# Patient Record
Sex: FEMALE | Race: WHITE | Employment: OTHER | URBAN - NONMETROPOLITAN AREA
[De-identification: names, ages, dates, MRNs, and addresses within clinical notes are randomized per-mention and may not be internally consistent; named-entity substitution may affect disease eponyms.]

---

## 2017-03-17 ENCOUNTER — HOSPITAL ENCOUNTER (OUTPATIENT)
Dept: GENERAL RADIOLOGY | Age: 82
Discharge: HOME | End: 2017-03-17
Payer: MEDICARE

## 2017-03-17 ENCOUNTER — HOSPITAL ENCOUNTER (OUTPATIENT)
Age: 82
Discharge: HOME | End: 2017-03-17
Payer: MEDICARE

## 2017-03-17 DIAGNOSIS — R10.31 RIGHT LOWER QUADRANT ABDOMINAL PAIN: ICD-10-CM

## 2017-03-17 DIAGNOSIS — R10.32 ABDOMINAL PAIN, LLQ: ICD-10-CM

## 2017-03-17 PROBLEM — M40.204 KYPHOSIS OF THORACIC REGION: Status: ACTIVE | Noted: 2017-03-17

## 2017-03-17 PROBLEM — K64.8 INTERNAL HEMORRHOID: Status: ACTIVE | Noted: 2017-03-17

## 2017-03-17 PROCEDURE — 74000 XR ABDOMEN LIMITED (KUB): CPT

## 2017-11-17 ENCOUNTER — OFFICE VISIT (OUTPATIENT)
Dept: FAMILY MEDICINE CLINIC | Age: 82
End: 2017-11-17
Payer: MEDICARE

## 2017-11-17 VITALS — DIASTOLIC BLOOD PRESSURE: 78 MMHG | BODY MASS INDEX: 23.69 KG/M2 | SYSTOLIC BLOOD PRESSURE: 122 MMHG | WEIGHT: 138 LBS

## 2017-11-17 DIAGNOSIS — Z00.00 ROUTINE GENERAL MEDICAL EXAMINATION AT A HEALTH CARE FACILITY: Primary | ICD-10-CM

## 2017-11-17 PROCEDURE — G8599 NO ASA/ANTIPLAT THER USE RNG: HCPCS | Performed by: FAMILY MEDICINE

## 2017-11-17 PROCEDURE — G0438 PPPS, INITIAL VISIT: HCPCS | Performed by: FAMILY MEDICINE

## 2017-11-17 NOTE — PROGRESS NOTES
[address] [address]  [place] [address]  Dept: [phone]      Beth Aguilar is a 80 y.o. female here for Medicare AWV      HPI:  Pt is concerned with her liver function , thyroid function and gallbladder function because of her \"inability to process proteins\". She states that when she began to increase her protein intake she started to feel allergic type symptoms which included intense itchy eyes, feels as her sinuses are inflammed and runny nose. She is also concerned that she is losing muscle mass and energy which is why she is concerned with her thyroid function. Prior to Admission medications    Medication Sig Start Date End Date Taking? Authorizing Provider   acetaminophen (TYLENOL) 500 MG tablet Take 500 mg by mouth every 6 hours as needed for Pain   Yes Historical Provider, MD   Loratadine (CLARITIN) 10 MG CAPS Take  by mouth daily. Yes Historical Provider, MD   ibuprofen (ADVIL;MOTRIN) 200 MG tablet Take 800 mg by mouth 2 times daily    Yes Historical Provider, MD   hydrocortisone (ANUSOL-HC) 25 MG suppository Place 1 suppository rectally every 12 hours 3/17/17   Marlon MD Dimitris       ROS:  General Constitutional: Denies chills. Denies fever. Denies headache. Denies lightheadedness. Ophthalmologic: Denies blurred vision. ENT: Admits nasal congestion. Denies sore throat. Denies ear pain and pressure. Respiratory: Denies cough. Denies shortness of breath. Denies wheezing. Cardiovascular: Denies chest pain at rest. Denies irregular heartbeat. Denies palpitations. Gastrointestinal: Denies abdominal pain. Denies blood in the stool. Denies constipation. Denies diarrhea. Denies nausea. Denies vomiting. Admits bloating after meals. Genitourinary: Denies blood in the urine. Denies difficulty urinating. Denies frequent urination. Denies painful urination. Denies urinary incontinence  Musculoskeletal: Denies muscle aches. Denies painful joints.

## 2017-11-17 NOTE — PROGRESS NOTES
[address]  [place] [phone]  Dept: [phone]      Mark Berman is a 80 y.o. female here for No chief complaint on file. HPI:  ***  Prior to Admission medications    Medication Sig Start Date End Date Taking? Authorizing Provider   hydrocortisone (ANUSOL-HC) 25 MG suppository Place 1 suppository rectally every 12 hours 3/17/17   Noelle Dawson MD   acetaminophen (TYLENOL) 500 MG tablet Take 500 mg by mouth every 6 hours as needed for Pain    Historical Provider, MD   Loratadine (CLARITIN) 10 MG CAPS Take  by mouth daily. Historical Provider, MD   ibuprofen (ADVIL;MOTRIN) 200 MG tablet Take 800 mg by mouth 2 times daily     Historical Provider, MD       ROS:  General Constitutional: Denies chills. Denies fever. Denies headache. Denies lightheadedness. Ophthalmologic: Denies blurred vision. ENT: Denies nasal congestion. Denies sore throat. Denies ear pain and pressure. Respiratory: Denies cough. Denies shortness of breath. Denies wheezing. Cardiovascular: Denies chest pain at rest. Denies irregular heartbeat. Denies palpitations. Gastrointestinal: Denies abdominal pain. Denies blood in the stool. Denies constipation. Denies diarrhea. Denies nausea. Denies vomiting. Genitourinary: Denies blood in the urine. Denies difficulty urinating. Denies frequent urination. Denies painful urination. Denies urinary incontinence  Musculoskeletal: Denies muscle aches. Denies painful joints. Denies swollen joints. Peripheral Vascular: Denies pain/cramping in legs after exertion. Skin: Denies dry skin. Denies itching. Denies rash. Neurologic: Denies falls. Denies dizziness. Denies fainting. Denies tingling/numbness. Psychiatric: Denies sleep disturbance. Denies anxiety. Denies depressed mood.     Past Surgical History:   Procedure Laterality Date    COLONOSCOPY  11/30/2016    -hemorrhoids    CYST REMOVAL      scalp, sebaceous, head/neck

## 2017-11-17 NOTE — PATIENT INSTRUCTIONS
specialist is recommended every 1-2 years to screen for glaucoma; cataracts, macular degeneration, and other eye disorders. · A preventive dental visit is recommended every 6 months. · Try to get at least 150 minutes of exercise per week or 10,000 steps per day on a pedometer . · Order or download the FREE \"Exercise & Physical Activity: Your Everyday Guide\" from [employer]. Call 1-186.594.6371 or search [employer] online. · You need 7684-3184 mg of calcium and 6789-2555 IU of vitamin D per day. It is possible to meet your calcium requirement with diet alone, but a vitamin D supplement is usually necessary to meet this goal.  · When exposed to the sun, use a sunscreen that protects against both UVA and UVB radiation with an SPF of 30 or greater. Reapply every 2 to 3 hours or after sweating, drying off with a towel, or swimming. · Always wear a seat belt when traveling in a car. Always wear a helmet when riding a bicycle or motorcycle.

## 2017-11-20 ASSESSMENT — PATIENT HEALTH QUESTIONNAIRE - PHQ9
2. FEELING DOWN, DEPRESSED OR HOPELESS: 0
SUM OF ALL RESPONSES TO PHQ QUESTIONS 1-9: 0
SUM OF ALL RESPONSES TO PHQ9 QUESTIONS 1 & 2: 0
1. LITTLE INTEREST OR PLEASURE IN DOING THINGS: 0

## 2018-01-25 ENCOUNTER — OFFICE VISIT (OUTPATIENT)
Dept: SURGERY | Age: 83
End: 2018-01-25
Payer: MEDICARE

## 2018-01-25 VITALS
DIASTOLIC BLOOD PRESSURE: 93 MMHG | BODY MASS INDEX: 23.7 KG/M2 | TEMPERATURE: 97.9 F | HEIGHT: 64 IN | HEART RATE: 63 BPM | WEIGHT: 138.8 LBS | SYSTOLIC BLOOD PRESSURE: 159 MMHG

## 2018-01-25 DIAGNOSIS — K90.0: Primary | ICD-10-CM

## 2018-01-25 DIAGNOSIS — K59.04 CHRONIC IDIOPATHIC CONSTIPATION: ICD-10-CM

## 2018-01-25 DIAGNOSIS — K64.8 INTERNAL HEMORRHOIDS: ICD-10-CM

## 2018-01-25 DIAGNOSIS — R14.0 POSTPRANDIAL ABDOMINAL BLOATING: ICD-10-CM

## 2018-01-25 PROCEDURE — 1036F TOBACCO NON-USER: CPT | Performed by: SURGERY

## 2018-01-25 PROCEDURE — G8484 FLU IMMUNIZE NO ADMIN: HCPCS | Performed by: SURGERY

## 2018-01-25 PROCEDURE — G8427 DOCREV CUR MEDS BY ELIG CLIN: HCPCS | Performed by: SURGERY

## 2018-01-25 PROCEDURE — G8400 PT W/DXA NO RESULTS DOC: HCPCS | Performed by: SURGERY

## 2018-01-25 PROCEDURE — 1123F ACP DISCUSS/DSCN MKR DOCD: CPT | Performed by: SURGERY

## 2018-01-25 PROCEDURE — G8420 CALC BMI NORM PARAMETERS: HCPCS | Performed by: SURGERY

## 2018-01-25 PROCEDURE — 99214 OFFICE O/P EST MOD 30 MIN: CPT | Performed by: SURGERY

## 2018-01-25 PROCEDURE — 1090F PRES/ABSN URINE INCON ASSESS: CPT | Performed by: SURGERY

## 2018-01-25 PROCEDURE — 4040F PNEUMOC VAC/ADMIN/RCVD: CPT | Performed by: SURGERY

## 2018-02-07 ENCOUNTER — TELEPHONE (OUTPATIENT)
Dept: FAMILY MEDICINE CLINIC | Age: 83
End: 2018-02-07

## 2018-02-07 DIAGNOSIS — R10.9 ABDOMINAL DISCOMFORT: ICD-10-CM

## 2018-02-07 DIAGNOSIS — R10.31 RIGHT LOWER QUADRANT ABDOMINAL PAIN: Primary | ICD-10-CM

## 2018-02-07 DIAGNOSIS — R14.0 ABDOMINAL BLOATING: ICD-10-CM

## 2018-02-07 DIAGNOSIS — K64.8 INTERNAL HEMORRHOID: ICD-10-CM

## 2018-04-12 PROBLEM — Z00.00 ROUTINE GENERAL MEDICAL EXAMINATION AT A HEALTH CARE FACILITY: Status: RESOLVED | Noted: 2017-11-17 | Resolved: 2018-04-12

## 2018-05-12 ENCOUNTER — APPOINTMENT (OUTPATIENT)
Dept: GENERAL RADIOLOGY | Age: 83
DRG: 481 | End: 2018-05-12
Payer: MEDICARE

## 2018-05-12 ENCOUNTER — HOSPITAL ENCOUNTER (INPATIENT)
Age: 83
LOS: 4 days | Discharge: NURSING, REHAB OR LONG-TERM CARE | DRG: 481 | End: 2018-05-16
Attending: FAMILY MEDICINE | Admitting: INTERNAL MEDICINE
Payer: MEDICARE

## 2018-05-12 DIAGNOSIS — D62 ACUTE BLOOD LOSS AS CAUSE OF POSTOPERATIVE ANEMIA: ICD-10-CM

## 2018-05-12 DIAGNOSIS — S72.002A CLOSED FRACTURE OF LEFT HIP, INITIAL ENCOUNTER (HCC): Primary | ICD-10-CM

## 2018-05-12 DIAGNOSIS — T14.8XXA FRACTURE: ICD-10-CM

## 2018-05-12 DIAGNOSIS — D72.829 LEUKOCYTOSIS, UNSPECIFIED TYPE: ICD-10-CM

## 2018-05-12 DIAGNOSIS — S70.211A ABRASION OF RIGHT HIP, INITIAL ENCOUNTER: ICD-10-CM

## 2018-05-12 DIAGNOSIS — S72.142A CLOSED DISPLACED INTERTROCHANTERIC FRACTURE OF LEFT FEMUR, INITIAL ENCOUNTER (HCC): ICD-10-CM

## 2018-05-12 DIAGNOSIS — T79.6XXA TRAUMATIC RHABDOMYOLYSIS, INITIAL ENCOUNTER (HCC): ICD-10-CM

## 2018-05-12 PROBLEM — R10.32 ABDOMINAL PAIN, LLQ: Status: RESOLVED | Noted: 2017-03-17 | Resolved: 2018-05-12

## 2018-05-12 PROBLEM — M40.204 KYPHOSIS OF THORACIC REGION: Chronic | Status: ACTIVE | Noted: 2017-03-17

## 2018-05-12 PROBLEM — K64.8 INTERNAL HEMORRHOID: Chronic | Status: ACTIVE | Noted: 2017-03-17

## 2018-05-12 LAB
-: ABNORMAL
ABSOLUTE EOS #: 0 K/UL (ref 0–0.44)
ABSOLUTE IMMATURE GRANULOCYTE: 0 K/UL (ref 0–0.3)
ABSOLUTE LYMPH #: 2.06 K/UL (ref 1.1–3.7)
ABSOLUTE MONO #: 0.94 K/UL (ref 0.1–1.2)
ALBUMIN SERPL-MCNC: 4.1 G/DL (ref 3.5–5.2)
ALBUMIN/GLOBULIN RATIO: 1.2 (ref 1–2.5)
ALP BLD-CCNC: 93 U/L (ref 35–104)
ALT SERPL-CCNC: 29 U/L (ref 5–33)
AMORPHOUS: ABNORMAL
ANION GAP SERPL CALCULATED.3IONS-SCNC: 15 MMOL/L (ref 9–17)
AST SERPL-CCNC: 55 U/L
BACTERIA: ABNORMAL
BASOPHILS # BLD: 0 % (ref 0–2)
BASOPHILS ABSOLUTE: 0 K/UL (ref 0–0.2)
BILIRUB SERPL-MCNC: 0.82 MG/DL (ref 0.3–1.2)
BILIRUBIN URINE: NEGATIVE
BUN BLDV-MCNC: 21 MG/DL (ref 8–23)
BUN/CREAT BLD: 37 (ref 9–20)
CALCIUM SERPL-MCNC: 9.6 MG/DL (ref 8.6–10.4)
CASTS UA: ABNORMAL /LPF
CHLORIDE BLD-SCNC: 99 MMOL/L (ref 98–107)
CO2: 24 MMOL/L (ref 20–31)
COLOR: YELLOW
COMMENT UA: ABNORMAL
CREAT SERPL-MCNC: 0.57 MG/DL (ref 0.5–0.9)
CRYSTALS, UA: ABNORMAL /HPF
DIFFERENTIAL TYPE: ABNORMAL
EOSINOPHILS RELATIVE PERCENT: 0 % (ref 1–4)
EPITHELIAL CELLS UA: ABNORMAL /HPF (ref 0–25)
GFR AFRICAN AMERICAN: >60 ML/MIN
GFR NON-AFRICAN AMERICAN: >60 ML/MIN
GFR SERPL CREATININE-BSD FRML MDRD: ABNORMAL ML/MIN/{1.73_M2}
GFR SERPL CREATININE-BSD FRML MDRD: ABNORMAL ML/MIN/{1.73_M2}
GLUCOSE BLD-MCNC: 147 MG/DL (ref 70–99)
GLUCOSE URINE: NEGATIVE
HCT VFR BLD CALC: 37 % (ref 36.3–47.1)
HEMOGLOBIN: 12.4 G/DL (ref 11.9–15.1)
IMMATURE GRANULOCYTES: 0 %
INR BLD: 1 (ref 0.9–1.2)
KETONES, URINE: ABNORMAL
LEUKOCYTE ESTERASE, URINE: ABNORMAL
LYMPHOCYTES # BLD: 11 % (ref 24–43)
MCH RBC QN AUTO: 29.8 PG (ref 25.2–33.5)
MCHC RBC AUTO-ENTMCNC: 33.5 G/DL (ref 28.4–34.8)
MCV RBC AUTO: 88.9 FL (ref 82.6–102.9)
MONOCYTES # BLD: 5 % (ref 3–12)
MORPHOLOGY: NORMAL
MUCUS: ABNORMAL
NITRITE, URINE: NEGATIVE
NRBC AUTOMATED: 0 PER 100 WBC
OTHER OBSERVATIONS UA: ABNORMAL
PARTIAL THROMBOPLASTIN TIME: 20.3 SEC (ref 23.2–34.4)
PDW BLD-RTO: 12.9 % (ref 11.8–14.4)
PH UA: 6 (ref 5–9)
PLATELET # BLD: 215 K/UL (ref 138–453)
PLATELET ESTIMATE: ABNORMAL
PMV BLD AUTO: 10.1 FL (ref 8.1–13.5)
POTASSIUM SERPL-SCNC: 4.3 MMOL/L (ref 3.7–5.3)
PROTEIN UA: ABNORMAL
PROTHROMBIN TIME: 10.2 SEC (ref 9.7–12.2)
RBC # BLD: 4.16 M/UL (ref 3.95–5.11)
RBC # BLD: ABNORMAL 10*6/UL
RBC UA: ABNORMAL /HPF (ref 0–2)
RENAL EPITHELIAL, UA: ABNORMAL /HPF
SEG NEUTROPHILS: 84 % (ref 36–65)
SEGMENTED NEUTROPHILS ABSOLUTE COUNT: 15.7 K/UL (ref 1.5–8.1)
SODIUM BLD-SCNC: 138 MMOL/L (ref 135–144)
SPECIFIC GRAVITY UA: 1.02 (ref 1.01–1.02)
TOTAL CK: 825 U/L (ref 26–192)
TOTAL PROTEIN: 7.4 G/DL (ref 6.4–8.3)
TRICHOMONAS: ABNORMAL
TURBIDITY: CLEAR
URINE HGB: NEGATIVE
UROBILINOGEN, URINE: NORMAL
WBC # BLD: 18.7 K/UL (ref 3.5–11.3)
WBC # BLD: ABNORMAL 10*3/UL
WBC UA: ABNORMAL /HPF (ref 0–5)
YEAST: ABNORMAL

## 2018-05-12 PROCEDURE — 85610 PROTHROMBIN TIME: CPT

## 2018-05-12 PROCEDURE — 81001 URINALYSIS AUTO W/SCOPE: CPT

## 2018-05-12 PROCEDURE — 71045 X-RAY EXAM CHEST 1 VIEW: CPT

## 2018-05-12 PROCEDURE — 86901 BLOOD TYPING SEROLOGIC RH(D): CPT

## 2018-05-12 PROCEDURE — 85025 COMPLETE CBC W/AUTO DIFF WBC: CPT

## 2018-05-12 PROCEDURE — 6360000002 HC RX W HCPCS: Performed by: PHYSICIAN ASSISTANT

## 2018-05-12 PROCEDURE — 96374 THER/PROPH/DIAG INJ IV PUSH: CPT

## 2018-05-12 PROCEDURE — 86850 RBC ANTIBODY SCREEN: CPT

## 2018-05-12 PROCEDURE — 6370000000 HC RX 637 (ALT 250 FOR IP): Performed by: INTERNAL MEDICINE

## 2018-05-12 PROCEDURE — 96375 TX/PRO/DX INJ NEW DRUG ADDON: CPT

## 2018-05-12 PROCEDURE — 94760 N-INVAS EAR/PLS OXIMETRY 1: CPT

## 2018-05-12 PROCEDURE — 73502 X-RAY EXAM HIP UNI 2-3 VIEWS: CPT

## 2018-05-12 PROCEDURE — 51702 INSERT TEMP BLADDER CATH: CPT

## 2018-05-12 PROCEDURE — 99285 EMERGENCY DEPT VISIT HI MDM: CPT

## 2018-05-12 PROCEDURE — 82550 ASSAY OF CK (CPK): CPT

## 2018-05-12 PROCEDURE — 2580000003 HC RX 258: Performed by: PHYSICIAN ASSISTANT

## 2018-05-12 PROCEDURE — 73552 X-RAY EXAM OF FEMUR 2/>: CPT

## 2018-05-12 PROCEDURE — 85730 THROMBOPLASTIN TIME PARTIAL: CPT

## 2018-05-12 PROCEDURE — 36415 COLL VENOUS BLD VENIPUNCTURE: CPT

## 2018-05-12 PROCEDURE — 6360000002 HC RX W HCPCS: Performed by: INTERNAL MEDICINE

## 2018-05-12 PROCEDURE — 86900 BLOOD TYPING SEROLOGIC ABO: CPT

## 2018-05-12 PROCEDURE — 2580000003 HC RX 258: Performed by: INTERNAL MEDICINE

## 2018-05-12 PROCEDURE — 1200000000 HC SEMI PRIVATE

## 2018-05-12 PROCEDURE — 80053 COMPREHEN METABOLIC PANEL: CPT

## 2018-05-12 RX ORDER — MORPHINE SULFATE 4 MG/ML
4 INJECTION, SOLUTION INTRAMUSCULAR; INTRAVENOUS ONCE
Status: COMPLETED | OUTPATIENT
Start: 2018-05-12 | End: 2018-05-12

## 2018-05-12 RX ORDER — ACETAMINOPHEN 500 MG
500 TABLET ORAL EVERY 6 HOURS PRN
Status: DISCONTINUED | OUTPATIENT
Start: 2018-05-12 | End: 2018-05-16 | Stop reason: HOSPADM

## 2018-05-12 RX ORDER — SODIUM CHLORIDE 9 MG/ML
INJECTION, SOLUTION INTRAVENOUS CONTINUOUS
Status: DISCONTINUED | OUTPATIENT
Start: 2018-05-12 | End: 2018-05-15

## 2018-05-12 RX ORDER — ONDANSETRON 2 MG/ML
4 INJECTION INTRAMUSCULAR; INTRAVENOUS EVERY 6 HOURS PRN
Status: DISCONTINUED | OUTPATIENT
Start: 2018-05-12 | End: 2018-05-16 | Stop reason: HOSPADM

## 2018-05-12 RX ORDER — SODIUM CHLORIDE 0.9 % (FLUSH) 0.9 %
10 SYRINGE (ML) INJECTION PRN
Status: DISCONTINUED | OUTPATIENT
Start: 2018-05-12 | End: 2018-05-16 | Stop reason: HOSPADM

## 2018-05-12 RX ORDER — ONDANSETRON 2 MG/ML
4 INJECTION INTRAMUSCULAR; INTRAVENOUS ONCE
Status: COMPLETED | OUTPATIENT
Start: 2018-05-12 | End: 2018-05-12

## 2018-05-12 RX ORDER — SODIUM CHLORIDE 9 MG/ML
INJECTION, SOLUTION INTRAVENOUS CONTINUOUS
Status: DISCONTINUED | OUTPATIENT
Start: 2018-05-12 | End: 2018-05-13

## 2018-05-12 RX ORDER — MORPHINE SULFATE 10 MG/ML
4 INJECTION, SOLUTION INTRAMUSCULAR; INTRAVENOUS
Status: DISCONTINUED | OUTPATIENT
Start: 2018-05-12 | End: 2018-05-16 | Stop reason: HOSPADM

## 2018-05-12 RX ORDER — MORPHINE SULFATE 10 MG/ML
2 INJECTION, SOLUTION INTRAMUSCULAR; INTRAVENOUS
Status: DISCONTINUED | OUTPATIENT
Start: 2018-05-12 | End: 2018-05-16 | Stop reason: HOSPADM

## 2018-05-12 RX ORDER — SODIUM CHLORIDE 0.9 % (FLUSH) 0.9 %
10 SYRINGE (ML) INJECTION EVERY 12 HOURS SCHEDULED
Status: DISCONTINUED | OUTPATIENT
Start: 2018-05-12 | End: 2018-05-16 | Stop reason: HOSPADM

## 2018-05-12 RX ADMIN — SODIUM CHLORIDE: 9 INJECTION, SOLUTION INTRAVENOUS at 14:34

## 2018-05-12 RX ADMIN — ACETAMINOPHEN 500 MG: 500 TABLET ORAL at 22:33

## 2018-05-12 RX ADMIN — ENOXAPARIN SODIUM 40 MG: 40 INJECTION SUBCUTANEOUS at 19:39

## 2018-05-12 RX ADMIN — MORPHINE SULFATE 4 MG: 4 INJECTION INTRAVENOUS at 13:55

## 2018-05-12 RX ADMIN — MORPHINE SULFATE 4 MG: 10 INJECTION, SOLUTION INTRAMUSCULAR; INTRAVENOUS at 23:37

## 2018-05-12 RX ADMIN — SODIUM CHLORIDE: 9 INJECTION, SOLUTION INTRAVENOUS at 19:39

## 2018-05-12 RX ADMIN — ONDANSETRON 4 MG: 2 INJECTION INTRAMUSCULAR; INTRAVENOUS at 13:55

## 2018-05-12 ASSESSMENT — PAIN DESCRIPTION - ORIENTATION
ORIENTATION: LEFT
ORIENTATION: RIGHT
ORIENTATION: RIGHT
ORIENTATION: LEFT

## 2018-05-12 ASSESSMENT — PAIN SCALES - GENERAL
PAINLEVEL_OUTOF10: 7
PAINLEVEL_OUTOF10: 4
PAINLEVEL_OUTOF10: 3
PAINLEVEL_OUTOF10: 4
PAINLEVEL_OUTOF10: 6
PAINLEVEL_OUTOF10: 4
PAINLEVEL_OUTOF10: 2

## 2018-05-12 ASSESSMENT — ENCOUNTER SYMPTOMS
ABDOMINAL PAIN: 0
SHORTNESS OF BREATH: 0
NAUSEA: 0
COUGH: 0
VOMITING: 0

## 2018-05-12 ASSESSMENT — PAIN DESCRIPTION - FREQUENCY
FREQUENCY: CONTINUOUS
FREQUENCY: CONTINUOUS

## 2018-05-12 ASSESSMENT — PAIN DESCRIPTION - LOCATION
LOCATION: HIP

## 2018-05-12 ASSESSMENT — PAIN DESCRIPTION - DESCRIPTORS
DESCRIPTORS: ACHING
DESCRIPTORS: ACHING;SHARP

## 2018-05-12 ASSESSMENT — PAIN DESCRIPTION - PAIN TYPE
TYPE: ACUTE PAIN
TYPE: ACUTE PAIN

## 2018-05-13 ENCOUNTER — ANESTHESIA EVENT (OUTPATIENT)
Dept: OPERATING ROOM | Age: 83
DRG: 481 | End: 2018-05-13
Payer: MEDICARE

## 2018-05-13 ENCOUNTER — APPOINTMENT (OUTPATIENT)
Dept: GENERAL RADIOLOGY | Age: 83
DRG: 481 | End: 2018-05-13
Payer: MEDICARE

## 2018-05-13 ENCOUNTER — ANESTHESIA (OUTPATIENT)
Dept: OPERATING ROOM | Age: 83
DRG: 481 | End: 2018-05-13
Payer: MEDICARE

## 2018-05-13 VITALS — TEMPERATURE: 96.7 F | SYSTOLIC BLOOD PRESSURE: 74 MMHG | DIASTOLIC BLOOD PRESSURE: 48 MMHG | OXYGEN SATURATION: 90 %

## 2018-05-13 PROBLEM — S72.142A CLOSED DISPLACED INTERTROCHANTERIC FRACTURE OF LEFT FEMUR (HCC): Status: ACTIVE | Noted: 2018-05-12

## 2018-05-13 LAB
ABSOLUTE EOS #: 0.03 K/UL (ref 0–0.44)
ABSOLUTE IMMATURE GRANULOCYTE: 0.08 K/UL (ref 0–0.3)
ABSOLUTE LYMPH #: 1.9 K/UL (ref 1.1–3.7)
ABSOLUTE MONO #: 1.24 K/UL (ref 0.1–1.2)
ANION GAP SERPL CALCULATED.3IONS-SCNC: 8 MMOL/L (ref 9–17)
BASOPHILS # BLD: 0 % (ref 0–2)
BASOPHILS ABSOLUTE: 0.05 K/UL (ref 0–0.2)
BUN BLDV-MCNC: 27 MG/DL (ref 8–23)
BUN/CREAT BLD: 39 (ref 9–20)
CALCIUM SERPL-MCNC: 8.7 MG/DL (ref 8.6–10.4)
CHLORIDE BLD-SCNC: 104 MMOL/L (ref 98–107)
CO2: 26 MMOL/L (ref 20–31)
CREAT SERPL-MCNC: 0.7 MG/DL (ref 0.5–0.9)
DIFFERENTIAL TYPE: ABNORMAL
EOSINOPHILS RELATIVE PERCENT: 0 % (ref 1–4)
GFR AFRICAN AMERICAN: >60 ML/MIN
GFR NON-AFRICAN AMERICAN: >60 ML/MIN
GFR SERPL CREATININE-BSD FRML MDRD: ABNORMAL ML/MIN/{1.73_M2}
GFR SERPL CREATININE-BSD FRML MDRD: ABNORMAL ML/MIN/{1.73_M2}
GLUCOSE BLD-MCNC: 99 MG/DL (ref 70–99)
HCT VFR BLD CALC: 29.5 % (ref 36.3–47.1)
HEMOGLOBIN: 9.8 G/DL (ref 11.9–15.1)
IMMATURE GRANULOCYTES: 1 %
LYMPHOCYTES # BLD: 11 % (ref 24–43)
MCH RBC QN AUTO: 30.4 PG (ref 25.2–33.5)
MCHC RBC AUTO-ENTMCNC: 33.2 G/DL (ref 28.4–34.8)
MCV RBC AUTO: 91.6 FL (ref 82.6–102.9)
MONOCYTES # BLD: 7 % (ref 3–12)
NRBC AUTOMATED: 0 PER 100 WBC
PDW BLD-RTO: 13.2 % (ref 11.8–14.4)
PLATELET # BLD: 144 K/UL (ref 138–453)
PLATELET ESTIMATE: ABNORMAL
PMV BLD AUTO: 9.5 FL (ref 8.1–13.5)
POTASSIUM SERPL-SCNC: 4 MMOL/L (ref 3.7–5.3)
RBC # BLD: 3.22 M/UL (ref 3.95–5.11)
RBC # BLD: ABNORMAL 10*6/UL
SEG NEUTROPHILS: 81 % (ref 36–65)
SEGMENTED NEUTROPHILS ABSOLUTE COUNT: 14.05 K/UL (ref 1.5–8.1)
SODIUM BLD-SCNC: 138 MMOL/L (ref 135–144)
TOTAL CK: 467 U/L (ref 26–192)
WBC # BLD: 17.4 K/UL (ref 3.5–11.3)
WBC # BLD: ABNORMAL 10*3/UL

## 2018-05-13 PROCEDURE — 36415 COLL VENOUS BLD VENIPUNCTURE: CPT

## 2018-05-13 PROCEDURE — 85025 COMPLETE CBC W/AUTO DIFF WBC: CPT

## 2018-05-13 PROCEDURE — 1200000000 HC SEMI PRIVATE

## 2018-05-13 PROCEDURE — 6360000002 HC RX W HCPCS: Performed by: ORTHOPAEDIC SURGERY

## 2018-05-13 PROCEDURE — C1769 GUIDE WIRE: HCPCS | Performed by: ORTHOPAEDIC SURGERY

## 2018-05-13 PROCEDURE — 3600000014 HC SURGERY LEVEL 4 ADDTL 15MIN: Performed by: ORTHOPAEDIC SURGERY

## 2018-05-13 PROCEDURE — 0QS706Z REPOSITION LEFT UPPER FEMUR WITH INTRAMEDULLARY INTERNAL FIXATION DEVICE, OPEN APPROACH: ICD-10-PCS | Performed by: ORTHOPAEDIC SURGERY

## 2018-05-13 PROCEDURE — 3600000004 HC SURGERY LEVEL 4 BASE: Performed by: ORTHOPAEDIC SURGERY

## 2018-05-13 PROCEDURE — 2720000010 HC SURG SUPPLY STERILE: Performed by: ORTHOPAEDIC SURGERY

## 2018-05-13 PROCEDURE — 6360000002 HC RX W HCPCS: Performed by: INTERNAL MEDICINE

## 2018-05-13 PROCEDURE — 82550 ASSAY OF CK (CPK): CPT

## 2018-05-13 PROCEDURE — 2580000003 HC RX 258: Performed by: NURSE ANESTHETIST, CERTIFIED REGISTERED

## 2018-05-13 PROCEDURE — 3700000001 HC ADD 15 MINUTES (ANESTHESIA): Performed by: ORTHOPAEDIC SURGERY

## 2018-05-13 PROCEDURE — 80048 BASIC METABOLIC PNL TOTAL CA: CPT

## 2018-05-13 PROCEDURE — 73502 X-RAY EXAM HIP UNI 2-3 VIEWS: CPT

## 2018-05-13 PROCEDURE — 7100000000 HC PACU RECOVERY - FIRST 15 MIN: Performed by: ORTHOPAEDIC SURGERY

## 2018-05-13 PROCEDURE — 76942 ECHO GUIDE FOR BIOPSY: CPT | Performed by: NURSE ANESTHETIST, CERTIFIED REGISTERED

## 2018-05-13 PROCEDURE — 2780000010 HC IMPLANT OTHER: Performed by: ORTHOPAEDIC SURGERY

## 2018-05-13 PROCEDURE — 6370000000 HC RX 637 (ALT 250 FOR IP): Performed by: INTERNAL MEDICINE

## 2018-05-13 PROCEDURE — 2580000003 HC RX 258: Performed by: INTERNAL MEDICINE

## 2018-05-13 PROCEDURE — C1713 ANCHOR/SCREW BN/BN,TIS/BN: HCPCS | Performed by: ORTHOPAEDIC SURGERY

## 2018-05-13 PROCEDURE — 6360000002 HC RX W HCPCS: Performed by: NURSE ANESTHETIST, CERTIFIED REGISTERED

## 2018-05-13 PROCEDURE — 2500000003 HC RX 250 WO HCPCS: Performed by: NURSE ANESTHETIST, CERTIFIED REGISTERED

## 2018-05-13 PROCEDURE — 7100000001 HC PACU RECOVERY - ADDTL 15 MIN: Performed by: ORTHOPAEDIC SURGERY

## 2018-05-13 PROCEDURE — 3700000000 HC ANESTHESIA ATTENDED CARE: Performed by: ORTHOPAEDIC SURGERY

## 2018-05-13 DEVICE — NAIL IM L400MM DIA12MM 130DEG LNG L PROX FEM GRN TI CANN: Type: IMPLANTABLE DEVICE | Site: HIP | Status: FUNCTIONAL

## 2018-05-13 DEVICE — SCREW BNE L44MM DIA5MM COR DIA4.3MM TIB LT GRN TI ALLY ST: Type: IMPLANTABLE DEVICE | Site: HIP | Status: FUNCTIONAL

## 2018-05-13 DEVICE — SCREW BNE L48MM DIA5MM LAT FEM LT GRN TI ST LOK FULL THRD: Type: IMPLANTABLE DEVICE | Site: HIP | Status: FUNCTIONAL

## 2018-05-13 DEVICE — BLADE IM L100MM DIA1035MM PROX FEM G TI CANN HELI TFN ADV: Type: IMPLANTABLE DEVICE | Site: HIP | Status: FUNCTIONAL

## 2018-05-13 RX ORDER — FENTANYL CITRATE 50 UG/ML
INJECTION, SOLUTION INTRAMUSCULAR; INTRAVENOUS PRN
Status: DISCONTINUED | OUTPATIENT
Start: 2018-05-13 | End: 2018-05-13 | Stop reason: SDUPTHER

## 2018-05-13 RX ORDER — FENTANYL CITRATE 50 UG/ML
50 INJECTION, SOLUTION INTRAMUSCULAR; INTRAVENOUS EVERY 5 MIN PRN
Status: DISCONTINUED | OUTPATIENT
Start: 2018-05-13 | End: 2018-05-13 | Stop reason: HOSPADM

## 2018-05-13 RX ORDER — SODIUM CHLORIDE 0.9 % (FLUSH) 0.9 %
10 SYRINGE (ML) INJECTION PRN
Status: DISCONTINUED | OUTPATIENT
Start: 2018-05-13 | End: 2018-05-16 | Stop reason: HOSPADM

## 2018-05-13 RX ORDER — ROPIVACAINE HYDROCHLORIDE 5 MG/ML
INJECTION, SOLUTION EPIDURAL; INFILTRATION; PERINEURAL PRN
Status: DISCONTINUED | OUTPATIENT
Start: 2018-05-13 | End: 2018-05-13 | Stop reason: SDUPTHER

## 2018-05-13 RX ORDER — LIDOCAINE HYDROCHLORIDE 20 MG/ML
INJECTION, SOLUTION INFILTRATION; PERINEURAL PRN
Status: DISCONTINUED | OUTPATIENT
Start: 2018-05-13 | End: 2018-05-13 | Stop reason: SDUPTHER

## 2018-05-13 RX ORDER — FENTANYL CITRATE 50 UG/ML
25 INJECTION, SOLUTION INTRAMUSCULAR; INTRAVENOUS EVERY 5 MIN PRN
Status: DISCONTINUED | OUTPATIENT
Start: 2018-05-13 | End: 2018-05-13 | Stop reason: HOSPADM

## 2018-05-13 RX ORDER — SODIUM CHLORIDE, SODIUM LACTATE, POTASSIUM CHLORIDE, CALCIUM CHLORIDE 600; 310; 30; 20 MG/100ML; MG/100ML; MG/100ML; MG/100ML
INJECTION, SOLUTION INTRAVENOUS CONTINUOUS PRN
Status: DISCONTINUED | OUTPATIENT
Start: 2018-05-13 | End: 2018-05-13 | Stop reason: SDUPTHER

## 2018-05-13 RX ORDER — HYDROCODONE BITARTRATE AND ACETAMINOPHEN 5; 325 MG/1; MG/1
1 TABLET ORAL EVERY 4 HOURS PRN
Qty: 40 TABLET | Refills: 0 | Status: SHIPPED | OUTPATIENT
Start: 2018-05-13 | End: 2018-05-20

## 2018-05-13 RX ORDER — DOCUSATE SODIUM 100 MG/1
100 CAPSULE, LIQUID FILLED ORAL 2 TIMES DAILY
Status: DISCONTINUED | OUTPATIENT
Start: 2018-05-14 | End: 2018-05-16 | Stop reason: HOSPADM

## 2018-05-13 RX ORDER — SODIUM CHLORIDE 0.9 % (FLUSH) 0.9 %
10 SYRINGE (ML) INJECTION EVERY 12 HOURS SCHEDULED
Status: DISCONTINUED | OUTPATIENT
Start: 2018-05-13 | End: 2018-05-16 | Stop reason: HOSPADM

## 2018-05-13 RX ORDER — HYDROCODONE BITARTRATE AND ACETAMINOPHEN 5; 325 MG/1; MG/1
2 TABLET ORAL EVERY 4 HOURS PRN
Status: DISCONTINUED | OUTPATIENT
Start: 2018-05-13 | End: 2018-05-16 | Stop reason: HOSPADM

## 2018-05-13 RX ORDER — EPHEDRINE SULFATE 50 MG/ML
INJECTION, SOLUTION INTRAVENOUS PRN
Status: DISCONTINUED | OUTPATIENT
Start: 2018-05-13 | End: 2018-05-13 | Stop reason: SDUPTHER

## 2018-05-13 RX ORDER — DEXAMETHASONE SODIUM PHOSPHATE 10 MG/ML
INJECTION INTRAMUSCULAR; INTRAVENOUS PRN
Status: DISCONTINUED | OUTPATIENT
Start: 2018-05-13 | End: 2018-05-13 | Stop reason: SDUPTHER

## 2018-05-13 RX ORDER — POLYETHYLENE GLYCOL 3350 17 G/17G
17 POWDER, FOR SOLUTION ORAL DAILY PRN
Status: DISCONTINUED | OUTPATIENT
Start: 2018-05-13 | End: 2018-05-16 | Stop reason: HOSPADM

## 2018-05-13 RX ORDER — PROPOFOL 10 MG/ML
INJECTION, EMULSION INTRAVENOUS CONTINUOUS PRN
Status: DISCONTINUED | OUTPATIENT
Start: 2018-05-13 | End: 2018-05-13 | Stop reason: SDUPTHER

## 2018-05-13 RX ORDER — ONDANSETRON 2 MG/ML
4 INJECTION INTRAMUSCULAR; INTRAVENOUS
Status: DISCONTINUED | OUTPATIENT
Start: 2018-05-13 | End: 2018-05-13 | Stop reason: HOSPADM

## 2018-05-13 RX ORDER — OXYCODONE HYDROCHLORIDE 5 MG/1
5 TABLET ORAL
Status: DISCONTINUED | OUTPATIENT
Start: 2018-05-13 | End: 2018-05-13 | Stop reason: HOSPADM

## 2018-05-13 RX ORDER — HYDROCODONE BITARTRATE AND ACETAMINOPHEN 5; 325 MG/1; MG/1
1 TABLET ORAL EVERY 4 HOURS PRN
Status: DISCONTINUED | OUTPATIENT
Start: 2018-05-13 | End: 2018-05-16 | Stop reason: HOSPADM

## 2018-05-13 RX ADMIN — DEXAMETHASONE SODIUM PHOSPHATE 10 MG: 10 INJECTION INTRAMUSCULAR; INTRAVENOUS at 11:37

## 2018-05-13 RX ADMIN — PHENYLEPHRINE HYDROCHLORIDE 200 MCG: 10 INJECTION INTRAVENOUS at 12:39

## 2018-05-13 RX ADMIN — MORPHINE SULFATE 4 MG: 10 INJECTION, SOLUTION INTRAMUSCULAR; INTRAVENOUS at 05:09

## 2018-05-13 RX ADMIN — FENTANYL CITRATE 25 MCG: 50 INJECTION INTRAMUSCULAR; INTRAVENOUS at 12:15

## 2018-05-13 RX ADMIN — SODIUM CHLORIDE, POTASSIUM CHLORIDE, SODIUM LACTATE AND CALCIUM CHLORIDE: 600; 310; 30; 20 INJECTION, SOLUTION INTRAVENOUS at 12:55

## 2018-05-13 RX ADMIN — Medication 10 ML: at 09:31

## 2018-05-13 RX ADMIN — CEFAZOLIN SODIUM 2 G: 2 SOLUTION INTRAVENOUS at 17:26

## 2018-05-13 RX ADMIN — EPHEDRINE SULFATE 10 MG: 50 INJECTION INTRAMUSCULAR; INTRAVENOUS; SUBCUTANEOUS at 13:00

## 2018-05-13 RX ADMIN — FENTANYL CITRATE 50 MCG: 50 INJECTION INTRAMUSCULAR; INTRAVENOUS at 11:24

## 2018-05-13 RX ADMIN — PHENYLEPHRINE HYDROCHLORIDE 100 MCG: 10 INJECTION INTRAVENOUS at 12:57

## 2018-05-13 RX ADMIN — EPHEDRINE SULFATE 5 MG: 50 INJECTION INTRAMUSCULAR; INTRAVENOUS; SUBCUTANEOUS at 13:42

## 2018-05-13 RX ADMIN — MORPHINE SULFATE 4 MG: 10 INJECTION, SOLUTION INTRAMUSCULAR; INTRAVENOUS at 09:26

## 2018-05-13 RX ADMIN — PHENYLEPHRINE HYDROCHLORIDE 200 MCG: 10 INJECTION INTRAVENOUS at 12:47

## 2018-05-13 RX ADMIN — PROPOFOL 50 MCG/KG/MIN: 10 INJECTION, EMULSION INTRAVENOUS at 12:30

## 2018-05-13 RX ADMIN — PHENYLEPHRINE HYDROCHLORIDE 100 MCG: 10 INJECTION INTRAVENOUS at 13:23

## 2018-05-13 RX ADMIN — PHENYLEPHRINE HYDROCHLORIDE 200 MCG: 10 INJECTION INTRAVENOUS at 12:53

## 2018-05-13 RX ADMIN — PHENYLEPHRINE HYDROCHLORIDE 100 MCG: 10 INJECTION INTRAVENOUS at 13:08

## 2018-05-13 RX ADMIN — PHENYLEPHRINE HYDROCHLORIDE 100 MCG: 10 INJECTION INTRAVENOUS at 13:33

## 2018-05-13 RX ADMIN — EPHEDRINE SULFATE 5 MG: 50 INJECTION INTRAMUSCULAR; INTRAVENOUS; SUBCUTANEOUS at 13:41

## 2018-05-13 RX ADMIN — PHENYLEPHRINE HYDROCHLORIDE 200 MCG: 10 INJECTION INTRAVENOUS at 12:42

## 2018-05-13 RX ADMIN — CEFAZOLIN SODIUM 2 G: 2 SOLUTION INTRAVENOUS at 12:15

## 2018-05-13 RX ADMIN — PHENYLEPHRINE HYDROCHLORIDE 100 MCG: 10 INJECTION INTRAVENOUS at 13:12

## 2018-05-13 RX ADMIN — POLYETHYLENE GLYCOL 3350 17 G: 17 POWDER, FOR SOLUTION ORAL at 22:10

## 2018-05-13 RX ADMIN — EPHEDRINE SULFATE 5 MG: 50 INJECTION INTRAMUSCULAR; INTRAVENOUS; SUBCUTANEOUS at 13:22

## 2018-05-13 RX ADMIN — LIDOCAINE HYDROCHLORIDE 5 ML: 20 INJECTION, SOLUTION INFILTRATION; PERINEURAL at 12:30

## 2018-05-13 RX ADMIN — ROPIVACAINE HYDROCHLORIDE 30 ML: 5 INJECTION, SOLUTION EPIDURAL; INFILTRATION; PERINEURAL at 11:37

## 2018-05-13 RX ADMIN — EPHEDRINE SULFATE 5 MG: 50 INJECTION INTRAMUSCULAR; INTRAVENOUS; SUBCUTANEOUS at 13:33

## 2018-05-13 RX ADMIN — PHENYLEPHRINE HYDROCHLORIDE 200 MCG: 10 INJECTION INTRAVENOUS at 12:49

## 2018-05-13 ASSESSMENT — PULMONARY FUNCTION TESTS
PIF_VALUE: 1
PIF_VALUE: 0
PIF_VALUE: 1
PIF_VALUE: 0
PIF_VALUE: 1
PIF_VALUE: 1
PIF_VALUE: 0
PIF_VALUE: 1
PIF_VALUE: 0
PIF_VALUE: 1
PIF_VALUE: 0
PIF_VALUE: 1
PIF_VALUE: 0
PIF_VALUE: 1
PIF_VALUE: 0
PIF_VALUE: 1
PIF_VALUE: 0
PIF_VALUE: 0
PIF_VALUE: 1
PIF_VALUE: 0
PIF_VALUE: 1
PIF_VALUE: 0
PIF_VALUE: 1
PIF_VALUE: 1
PIF_VALUE: 0
PIF_VALUE: 1
PIF_VALUE: 0
PIF_VALUE: 0
PIF_VALUE: 1
PIF_VALUE: 0
PIF_VALUE: 0
PIF_VALUE: 1
PIF_VALUE: 0
PIF_VALUE: 1
PIF_VALUE: 0
PIF_VALUE: 1
PIF_VALUE: 0
PIF_VALUE: 1
PIF_VALUE: 0
PIF_VALUE: 0
PIF_VALUE: 1
PIF_VALUE: 1
PIF_VALUE: 0
PIF_VALUE: 1
PIF_VALUE: 1
PIF_VALUE: 0
PIF_VALUE: 1
PIF_VALUE: 1
PIF_VALUE: 0
PIF_VALUE: 1
PIF_VALUE: 0
PIF_VALUE: 1
PIF_VALUE: 0
PIF_VALUE: 1
PIF_VALUE: 0
PIF_VALUE: 1
PIF_VALUE: 0
PIF_VALUE: 0

## 2018-05-13 ASSESSMENT — PAIN SCALES - GENERAL
PAINLEVEL_OUTOF10: 0
PAINLEVEL_OUTOF10: 6
PAINLEVEL_OUTOF10: 0
PAINLEVEL_OUTOF10: 6
PAINLEVEL_OUTOF10: 0
PAINLEVEL_OUTOF10: 2
PAINLEVEL_OUTOF10: 0
PAINLEVEL_OUTOF10: 7

## 2018-05-13 ASSESSMENT — PAIN DESCRIPTION - PAIN TYPE
TYPE: ACUTE PAIN
TYPE: ACUTE PAIN

## 2018-05-13 ASSESSMENT — ENCOUNTER SYMPTOMS: SHORTNESS OF BREATH: 0

## 2018-05-13 ASSESSMENT — PAIN DESCRIPTION - ORIENTATION
ORIENTATION: LEFT
ORIENTATION: LEFT

## 2018-05-13 ASSESSMENT — PAIN DESCRIPTION - LOCATION
LOCATION: HIP
LOCATION: HIP

## 2018-05-13 ASSESSMENT — PAIN DESCRIPTION - ONSET: ONSET: ON-GOING

## 2018-05-13 ASSESSMENT — PAIN DESCRIPTION - DESCRIPTORS
DESCRIPTORS: ACHING
DESCRIPTORS: CRAMPING

## 2018-05-13 ASSESSMENT — PAIN DESCRIPTION - FREQUENCY: FREQUENCY: INTERMITTENT

## 2018-05-13 ASSESSMENT — PAIN DESCRIPTION - PROGRESSION: CLINICAL_PROGRESSION: NOT CHANGED

## 2018-05-14 PROBLEM — D62 ACUTE BLOOD LOSS AS CAUSE OF POSTOPERATIVE ANEMIA: Status: ACTIVE | Noted: 2018-05-14

## 2018-05-14 PROBLEM — I50.32 CHRONIC DIASTOLIC CHF (CONGESTIVE HEART FAILURE) (HCC): Chronic | Status: ACTIVE | Noted: 2018-05-14

## 2018-05-14 LAB
ABSOLUTE EOS #: 0 K/UL (ref 0–0.44)
ABSOLUTE IMMATURE GRANULOCYTE: 0 K/UL (ref 0–0.3)
ABSOLUTE LYMPH #: 0.41 K/UL (ref 1.1–3.7)
ABSOLUTE MONO #: 0.41 K/UL (ref 0.1–1.2)
ANION GAP SERPL CALCULATED.3IONS-SCNC: 8 MMOL/L (ref 9–17)
BASOPHILS # BLD: 0 % (ref 0–2)
BASOPHILS ABSOLUTE: 0 K/UL (ref 0–0.2)
BUN BLDV-MCNC: 18 MG/DL (ref 8–23)
BUN/CREAT BLD: 35 (ref 9–20)
CALCIUM SERPL-MCNC: 8.5 MG/DL (ref 8.6–10.4)
CHLORIDE BLD-SCNC: 106 MMOL/L (ref 98–107)
CO2: 25 MMOL/L (ref 20–31)
CREAT SERPL-MCNC: 0.51 MG/DL (ref 0.5–0.9)
DIFFERENTIAL TYPE: ABNORMAL
EOSINOPHILS RELATIVE PERCENT: 0 % (ref 1–4)
GFR AFRICAN AMERICAN: >60 ML/MIN
GFR NON-AFRICAN AMERICAN: >60 ML/MIN
GFR SERPL CREATININE-BSD FRML MDRD: ABNORMAL ML/MIN/{1.73_M2}
GFR SERPL CREATININE-BSD FRML MDRD: ABNORMAL ML/MIN/{1.73_M2}
GLUCOSE BLD-MCNC: 120 MG/DL (ref 70–99)
HCT VFR BLD CALC: 21.3 % (ref 36.3–47.1)
HCT VFR BLD CALC: 23.1 % (ref 36.3–47.1)
HEMOGLOBIN: 6.8 G/DL (ref 11.9–15.1)
HEMOGLOBIN: 7.8 G/DL (ref 11.9–15.1)
IMMATURE GRANULOCYTES: 0 %
LYMPHOCYTES # BLD: 2 % (ref 24–43)
MCH RBC QN AUTO: 30 PG (ref 25.2–33.5)
MCHC RBC AUTO-ENTMCNC: 31.9 G/DL (ref 28.4–34.8)
MCV RBC AUTO: 93.8 FL (ref 82.6–102.9)
MONOCYTES # BLD: 2 % (ref 3–12)
MORPHOLOGY: NORMAL
NRBC AUTOMATED: 0 PER 100 WBC
PDW BLD-RTO: 13.4 % (ref 11.8–14.4)
PLATELET # BLD: 115 K/UL (ref 138–453)
PLATELET ESTIMATE: ABNORMAL
PMV BLD AUTO: 9.7 FL (ref 8.1–13.5)
POTASSIUM SERPL-SCNC: 4.4 MMOL/L (ref 3.7–5.3)
RBC # BLD: 2.27 M/UL (ref 3.95–5.11)
RBC # BLD: ABNORMAL 10*6/UL
SEG NEUTROPHILS: 96 % (ref 36–65)
SEGMENTED NEUTROPHILS ABSOLUTE COUNT: 19.78 K/UL (ref 1.5–8.1)
SODIUM BLD-SCNC: 139 MMOL/L (ref 135–144)
TOTAL CK: 226 U/L (ref 26–192)
WBC # BLD: 20.6 K/UL (ref 3.5–11.3)
WBC # BLD: ABNORMAL 10*3/UL

## 2018-05-14 PROCEDURE — 97116 GAIT TRAINING THERAPY: CPT

## 2018-05-14 PROCEDURE — 97166 OT EVAL MOD COMPLEX 45 MIN: CPT

## 2018-05-14 PROCEDURE — G8979 MOBILITY GOAL STATUS: HCPCS

## 2018-05-14 PROCEDURE — G8978 MOBILITY CURRENT STATUS: HCPCS

## 2018-05-14 PROCEDURE — 6370000000 HC RX 637 (ALT 250 FOR IP): Performed by: ORTHOPAEDIC SURGERY

## 2018-05-14 PROCEDURE — 85014 HEMATOCRIT: CPT

## 2018-05-14 PROCEDURE — G8987 SELF CARE CURRENT STATUS: HCPCS

## 2018-05-14 PROCEDURE — 82550 ASSAY OF CK (CPK): CPT

## 2018-05-14 PROCEDURE — 2580000003 HC RX 258: Performed by: FAMILY MEDICINE

## 2018-05-14 PROCEDURE — 97535 SELF CARE MNGMENT TRAINING: CPT

## 2018-05-14 PROCEDURE — G8988 SELF CARE GOAL STATUS: HCPCS

## 2018-05-14 PROCEDURE — 80048 BASIC METABOLIC PNL TOTAL CA: CPT

## 2018-05-14 PROCEDURE — 2580000003 HC RX 258: Performed by: INTERNAL MEDICINE

## 2018-05-14 PROCEDURE — 6360000002 HC RX W HCPCS: Performed by: ORTHOPAEDIC SURGERY

## 2018-05-14 PROCEDURE — 1200000000 HC SEMI PRIVATE

## 2018-05-14 PROCEDURE — 85025 COMPLETE CBC W/AUTO DIFF WBC: CPT

## 2018-05-14 PROCEDURE — P9016 RBC LEUKOCYTES REDUCED: HCPCS

## 2018-05-14 PROCEDURE — 36415 COLL VENOUS BLD VENIPUNCTURE: CPT

## 2018-05-14 PROCEDURE — 94664 DEMO&/EVAL PT USE INHALER: CPT

## 2018-05-14 PROCEDURE — 6370000000 HC RX 637 (ALT 250 FOR IP): Performed by: INTERNAL MEDICINE

## 2018-05-14 PROCEDURE — 97162 PT EVAL MOD COMPLEX 30 MIN: CPT

## 2018-05-14 PROCEDURE — 36430 TRANSFUSION BLD/BLD COMPNT: CPT

## 2018-05-14 PROCEDURE — 85018 HEMOGLOBIN: CPT

## 2018-05-14 RX ORDER — 0.9 % SODIUM CHLORIDE 0.9 %
250 INTRAVENOUS SOLUTION INTRAVENOUS ONCE
Status: COMPLETED | OUTPATIENT
Start: 2018-05-14 | End: 2018-05-14

## 2018-05-14 RX ORDER — LEVALBUTEROL 1.25 MG/.5ML
1.25 SOLUTION, CONCENTRATE RESPIRATORY (INHALATION) EVERY 8 HOURS PRN
Status: DISCONTINUED | OUTPATIENT
Start: 2018-05-14 | End: 2018-05-16 | Stop reason: HOSPADM

## 2018-05-14 RX ADMIN — SODIUM CHLORIDE 250 ML: 9 INJECTION, SOLUTION INTRAVENOUS at 09:20

## 2018-05-14 RX ADMIN — SODIUM CHLORIDE: 9 INJECTION, SOLUTION INTRAVENOUS at 03:42

## 2018-05-14 RX ADMIN — SODIUM CHLORIDE: 9 INJECTION, SOLUTION INTRAVENOUS at 17:39

## 2018-05-14 RX ADMIN — DOCUSATE SODIUM 100 MG: 100 CAPSULE, LIQUID FILLED ORAL at 21:53

## 2018-05-14 RX ADMIN — ACETAMINOPHEN 500 MG: 500 TABLET ORAL at 16:04

## 2018-05-14 RX ADMIN — HYDROCODONE BITARTRATE AND ACETAMINOPHEN 2 TABLET: 5; 325 TABLET ORAL at 19:19

## 2018-05-14 RX ADMIN — CEFAZOLIN SODIUM 2 G: 2 SOLUTION INTRAVENOUS at 01:41

## 2018-05-14 RX ADMIN — DOCUSATE SODIUM 100 MG: 100 CAPSULE, LIQUID FILLED ORAL at 10:32

## 2018-05-14 RX ADMIN — ACETAMINOPHEN 500 MG: 500 TABLET ORAL at 05:49

## 2018-05-14 ASSESSMENT — PAIN DESCRIPTION - ORIENTATION
ORIENTATION: LEFT

## 2018-05-14 ASSESSMENT — PAIN SCALES - GENERAL
PAINLEVEL_OUTOF10: 0
PAINLEVEL_OUTOF10: 5
PAINLEVEL_OUTOF10: 5
PAINLEVEL_OUTOF10: 10
PAINLEVEL_OUTOF10: 1
PAINLEVEL_OUTOF10: 3
PAINLEVEL_OUTOF10: 0
PAINLEVEL_OUTOF10: 5
PAINLEVEL_OUTOF10: 0
PAINLEVEL_OUTOF10: 1
PAINLEVEL_OUTOF10: 5
PAINLEVEL_OUTOF10: 1
PAINLEVEL_OUTOF10: 0

## 2018-05-14 ASSESSMENT — PAIN DESCRIPTION - DESCRIPTORS
DESCRIPTORS: SHARP
DESCRIPTORS: SHARP;SHOOTING
DESCRIPTORS: ACHING

## 2018-05-14 ASSESSMENT — PAIN DESCRIPTION - PAIN TYPE
TYPE: SURGICAL PAIN

## 2018-05-14 ASSESSMENT — PAIN DESCRIPTION - FREQUENCY
FREQUENCY: INTERMITTENT

## 2018-05-14 ASSESSMENT — PAIN DESCRIPTION - LOCATION
LOCATION: HIP

## 2018-05-14 ASSESSMENT — PAIN DESCRIPTION - ONSET
ONSET: ON-GOING
ONSET: ON-GOING

## 2018-05-15 LAB
ABO/RH: NORMAL
ABSOLUTE EOS #: 0.11 K/UL (ref 0–0.44)
ABSOLUTE IMMATURE GRANULOCYTE: 0.15 K/UL (ref 0–0.3)
ABSOLUTE LYMPH #: 2.27 K/UL (ref 1.1–3.7)
ABSOLUTE MONO #: 1.22 K/UL (ref 0.1–1.2)
ANION GAP SERPL CALCULATED.3IONS-SCNC: 7 MMOL/L (ref 9–17)
ANTIBODY SCREEN: NEGATIVE
ARM BAND NUMBER: NORMAL
BASOPHILS # BLD: 0 % (ref 0–2)
BASOPHILS ABSOLUTE: 0.03 K/UL (ref 0–0.2)
BLD PROD TYP BPU: NORMAL
BUN BLDV-MCNC: 16 MG/DL (ref 8–23)
BUN/CREAT BLD: 38 (ref 9–20)
CALCIUM SERPL-MCNC: 7.9 MG/DL (ref 8.6–10.4)
CHLORIDE BLD-SCNC: 109 MMOL/L (ref 98–107)
CO2: 25 MMOL/L (ref 20–31)
CREAT SERPL-MCNC: 0.42 MG/DL (ref 0.5–0.9)
CROSSMATCH RESULT: NORMAL
DIFFERENTIAL TYPE: ABNORMAL
DISPENSE STATUS BLOOD BANK: NORMAL
EKG ATRIAL RATE: 72 BPM
EKG ATRIAL RATE: 90 BPM
EKG P AXIS: 50 DEGREES
EKG P AXIS: 60 DEGREES
EKG P-R INTERVAL: 158 MS
EKG P-R INTERVAL: 158 MS
EKG Q-T INTERVAL: 372 MS
EKG Q-T INTERVAL: 380 MS
EKG QRS DURATION: 82 MS
EKG QRS DURATION: 86 MS
EKG QTC CALCULATION (BAZETT): 416 MS
EKG QTC CALCULATION (BAZETT): 455 MS
EKG R AXIS: 22 DEGREES
EKG R AXIS: 7 DEGREES
EKG T AXIS: 50 DEGREES
EKG T AXIS: 54 DEGREES
EKG VENTRICULAR RATE: 72 BPM
EKG VENTRICULAR RATE: 90 BPM
EOSINOPHILS RELATIVE PERCENT: 1 % (ref 1–4)
EXPIRATION DATE: NORMAL
GFR AFRICAN AMERICAN: >60 ML/MIN
GFR NON-AFRICAN AMERICAN: >60 ML/MIN
GFR SERPL CREATININE-BSD FRML MDRD: ABNORMAL ML/MIN/{1.73_M2}
GFR SERPL CREATININE-BSD FRML MDRD: ABNORMAL ML/MIN/{1.73_M2}
GLUCOSE BLD-MCNC: 94 MG/DL (ref 70–99)
HCT VFR BLD CALC: 21.7 % (ref 36.3–47.1)
HEMOGLOBIN: 7.1 G/DL (ref 11.9–15.1)
IMMATURE GRANULOCYTES: 1 %
LYMPHOCYTES # BLD: 17 % (ref 24–43)
MCH RBC QN AUTO: 31 PG (ref 25.2–33.5)
MCHC RBC AUTO-ENTMCNC: 32.7 G/DL (ref 28.4–34.8)
MCV RBC AUTO: 94.8 FL (ref 82.6–102.9)
MONOCYTES # BLD: 9 % (ref 3–12)
NRBC AUTOMATED: 0 PER 100 WBC
PDW BLD-RTO: 13.6 % (ref 11.8–14.4)
PLATELET # BLD: 106 K/UL (ref 138–453)
PLATELET ESTIMATE: ABNORMAL
PMV BLD AUTO: 9.2 FL (ref 8.1–13.5)
POTASSIUM SERPL-SCNC: 3.9 MMOL/L (ref 3.7–5.3)
RBC # BLD: 2.29 M/UL (ref 3.95–5.11)
RBC # BLD: ABNORMAL 10*6/UL
SEG NEUTROPHILS: 72 % (ref 36–65)
SEGMENTED NEUTROPHILS ABSOLUTE COUNT: 9.85 K/UL (ref 1.5–8.1)
SODIUM BLD-SCNC: 141 MMOL/L (ref 135–144)
TOTAL CK: 176 U/L (ref 26–192)
TRANSFUSION STATUS: NORMAL
UNIT DIVISION: 0
UNIT NUMBER: NORMAL
WBC # BLD: 13.6 K/UL (ref 3.5–11.3)
WBC # BLD: ABNORMAL 10*3/UL

## 2018-05-15 PROCEDURE — 85025 COMPLETE CBC W/AUTO DIFF WBC: CPT

## 2018-05-15 PROCEDURE — 36415 COLL VENOUS BLD VENIPUNCTURE: CPT

## 2018-05-15 PROCEDURE — 6360000002 HC RX W HCPCS: Performed by: FAMILY MEDICINE

## 2018-05-15 PROCEDURE — 2580000003 HC RX 258: Performed by: INTERNAL MEDICINE

## 2018-05-15 PROCEDURE — 97535 SELF CARE MNGMENT TRAINING: CPT

## 2018-05-15 PROCEDURE — 6370000000 HC RX 637 (ALT 250 FOR IP): Performed by: INTERNAL MEDICINE

## 2018-05-15 PROCEDURE — 2580000003 HC RX 258: Performed by: FAMILY MEDICINE

## 2018-05-15 PROCEDURE — 80048 BASIC METABOLIC PNL TOTAL CA: CPT

## 2018-05-15 PROCEDURE — 82550 ASSAY OF CK (CPK): CPT

## 2018-05-15 PROCEDURE — 6370000000 HC RX 637 (ALT 250 FOR IP): Performed by: ORTHOPAEDIC SURGERY

## 2018-05-15 PROCEDURE — 97110 THERAPEUTIC EXERCISES: CPT

## 2018-05-15 PROCEDURE — 93005 ELECTROCARDIOGRAM TRACING: CPT

## 2018-05-15 PROCEDURE — 1200000000 HC SEMI PRIVATE

## 2018-05-15 PROCEDURE — 2580000003 HC RX 258: Performed by: ORTHOPAEDIC SURGERY

## 2018-05-15 RX ADMIN — HYDROCODONE BITARTRATE AND ACETAMINOPHEN 2 TABLET: 5; 325 TABLET ORAL at 00:25

## 2018-05-15 RX ADMIN — Medication 10 ML: at 20:55

## 2018-05-15 RX ADMIN — IRON SUCROSE 300 MG: 20 INJECTION, SOLUTION INTRAVENOUS at 08:19

## 2018-05-15 RX ADMIN — DOCUSATE SODIUM 100 MG: 100 CAPSULE, LIQUID FILLED ORAL at 20:54

## 2018-05-15 RX ADMIN — HYDROCODONE BITARTRATE AND ACETAMINOPHEN 2 TABLET: 5; 325 TABLET ORAL at 15:39

## 2018-05-15 RX ADMIN — Medication 10 ML: at 21:00

## 2018-05-15 RX ADMIN — HYDROCODONE BITARTRATE AND ACETAMINOPHEN 2 TABLET: 5; 325 TABLET ORAL at 21:53

## 2018-05-15 RX ADMIN — HYDROCODONE BITARTRATE AND ACETAMINOPHEN 2 TABLET: 5; 325 TABLET ORAL at 04:54

## 2018-05-15 RX ADMIN — HYDROCODONE BITARTRATE AND ACETAMINOPHEN 2 TABLET: 5; 325 TABLET ORAL at 10:12

## 2018-05-15 ASSESSMENT — PAIN DESCRIPTION - ORIENTATION
ORIENTATION: LEFT

## 2018-05-15 ASSESSMENT — PAIN DESCRIPTION - PAIN TYPE
TYPE: SURGICAL PAIN
TYPE: ACUTE PAIN;SURGICAL PAIN
TYPE: SURGICAL PAIN

## 2018-05-15 ASSESSMENT — PAIN DESCRIPTION - DESCRIPTORS
DESCRIPTORS: SHARP
DESCRIPTORS: SHARP;SHOOTING

## 2018-05-15 ASSESSMENT — PAIN DESCRIPTION - PROGRESSION
CLINICAL_PROGRESSION: NOT CHANGED
CLINICAL_PROGRESSION: NOT CHANGED

## 2018-05-15 ASSESSMENT — PAIN DESCRIPTION - LOCATION
LOCATION: HIP
LOCATION: LEG
LOCATION: HIP
LOCATION: LEG
LOCATION: LEG

## 2018-05-15 ASSESSMENT — PAIN SCALES - GENERAL
PAINLEVEL_OUTOF10: 3
PAINLEVEL_OUTOF10: 6
PAINLEVEL_OUTOF10: 3
PAINLEVEL_OUTOF10: 8
PAINLEVEL_OUTOF10: 10
PAINLEVEL_OUTOF10: 5
PAINLEVEL_OUTOF10: 3
PAINLEVEL_OUTOF10: 10
PAINLEVEL_OUTOF10: 3
PAINLEVEL_OUTOF10: 10

## 2018-05-15 ASSESSMENT — PAIN DESCRIPTION - FREQUENCY
FREQUENCY: INTERMITTENT

## 2018-05-15 ASSESSMENT — PAIN DESCRIPTION - ONSET
ONSET: ON-GOING

## 2018-05-16 VITALS
HEART RATE: 84 BPM | RESPIRATION RATE: 16 BRPM | WEIGHT: 136.9 LBS | TEMPERATURE: 98.4 F | BODY MASS INDEX: 24.26 KG/M2 | OXYGEN SATURATION: 93 % | SYSTOLIC BLOOD PRESSURE: 122 MMHG | DIASTOLIC BLOOD PRESSURE: 73 MMHG | HEIGHT: 63 IN

## 2018-05-16 LAB
ABSOLUTE EOS #: 0.37 K/UL (ref 0–0.44)
ABSOLUTE IMMATURE GRANULOCYTE: 0.28 K/UL (ref 0–0.3)
ABSOLUTE LYMPH #: 1.81 K/UL (ref 1.1–3.7)
ABSOLUTE MONO #: 1.14 K/UL (ref 0.1–1.2)
BASOPHILS # BLD: 1 % (ref 0–2)
BASOPHILS ABSOLUTE: 0.07 K/UL (ref 0–0.2)
DIFFERENTIAL TYPE: ABNORMAL
EOSINOPHILS RELATIVE PERCENT: 3 % (ref 1–4)
HCT VFR BLD CALC: 21 % (ref 36.3–47.1)
HEMOGLOBIN: 7 G/DL (ref 11.9–15.1)
IMMATURE GRANULOCYTES: 3 %
LYMPHOCYTES # BLD: 16 % (ref 24–43)
MCH RBC QN AUTO: 31.4 PG (ref 25.2–33.5)
MCHC RBC AUTO-ENTMCNC: 33.3 G/DL (ref 28.4–34.8)
MCV RBC AUTO: 94.2 FL (ref 82.6–102.9)
MONOCYTES # BLD: 10 % (ref 3–12)
NRBC AUTOMATED: 0.2 PER 100 WBC
PDW BLD-RTO: 13.9 % (ref 11.8–14.4)
PLATELET # BLD: 141 K/UL (ref 138–453)
PLATELET ESTIMATE: ABNORMAL
PMV BLD AUTO: 9.5 FL (ref 8.1–13.5)
RBC # BLD: 2.23 M/UL (ref 3.95–5.11)
RBC # BLD: ABNORMAL 10*6/UL
SEG NEUTROPHILS: 67 % (ref 36–65)
SEGMENTED NEUTROPHILS ABSOLUTE COUNT: 7.43 K/UL (ref 1.5–8.1)
WBC # BLD: 11.1 K/UL (ref 3.5–11.3)
WBC # BLD: ABNORMAL 10*3/UL

## 2018-05-16 PROCEDURE — 2580000003 HC RX 258: Performed by: FAMILY MEDICINE

## 2018-05-16 PROCEDURE — 6370000000 HC RX 637 (ALT 250 FOR IP): Performed by: FAMILY MEDICINE

## 2018-05-16 PROCEDURE — 2580000003 HC RX 258: Performed by: ORTHOPAEDIC SURGERY

## 2018-05-16 PROCEDURE — 85025 COMPLETE CBC W/AUTO DIFF WBC: CPT

## 2018-05-16 PROCEDURE — 36415 COLL VENOUS BLD VENIPUNCTURE: CPT

## 2018-05-16 PROCEDURE — 6370000000 HC RX 637 (ALT 250 FOR IP): Performed by: INTERNAL MEDICINE

## 2018-05-16 PROCEDURE — 6370000000 HC RX 637 (ALT 250 FOR IP): Performed by: PHYSICIAN ASSISTANT

## 2018-05-16 PROCEDURE — 97110 THERAPEUTIC EXERCISES: CPT

## 2018-05-16 PROCEDURE — 6360000002 HC RX W HCPCS: Performed by: FAMILY MEDICINE

## 2018-05-16 PROCEDURE — 6370000000 HC RX 637 (ALT 250 FOR IP): Performed by: ORTHOPAEDIC SURGERY

## 2018-05-16 PROCEDURE — 97116 GAIT TRAINING THERAPY: CPT

## 2018-05-16 RX ORDER — OYSTER SHELL CALCIUM WITH VITAMIN D 500; 200 MG/1; [IU]/1
1 TABLET, FILM COATED ORAL 2 TIMES DAILY
Status: DISCONTINUED | OUTPATIENT
Start: 2018-05-16 | End: 2018-05-16 | Stop reason: HOSPADM

## 2018-05-16 RX ORDER — PSEUDOEPHEDRINE HCL 30 MG
100 TABLET ORAL 2 TIMES DAILY
DISCHARGE
Start: 2018-05-16 | End: 2018-06-15 | Stop reason: ALTCHOICE

## 2018-05-16 RX ORDER — OYSTER SHELL CALCIUM WITH VITAMIN D 500; 200 MG/1; [IU]/1
1 TABLET, FILM COATED ORAL 2 TIMES DAILY
Qty: 30 TABLET | Refills: 3 | DISCHARGE
Start: 2018-05-16

## 2018-05-16 RX ORDER — POLYETHYLENE GLYCOL 3350 17 G/17G
17 POWDER, FOR SOLUTION ORAL ONCE
Status: COMPLETED | OUTPATIENT
Start: 2018-05-16 | End: 2018-05-16

## 2018-05-16 RX ORDER — POLYETHYLENE GLYCOL 3350 17 G/17G
17 POWDER, FOR SOLUTION ORAL DAILY PRN
Qty: 527 G | Refills: 1 | DISCHARGE
Start: 2018-05-16 | End: 2018-06-15

## 2018-05-16 RX ADMIN — Medication 1 TABLET: at 08:00

## 2018-05-16 RX ADMIN — HYDROCODONE BITARTRATE AND ACETAMINOPHEN 2 TABLET: 5; 325 TABLET ORAL at 04:00

## 2018-05-16 RX ADMIN — IRON SUCROSE 300 MG: 20 INJECTION, SOLUTION INTRAVENOUS at 08:01

## 2018-05-16 RX ADMIN — POLYETHYLENE GLYCOL 3350 17 G: 17 POWDER, FOR SOLUTION ORAL at 08:57

## 2018-05-16 RX ADMIN — DOCUSATE SODIUM 100 MG: 100 CAPSULE, LIQUID FILLED ORAL at 08:00

## 2018-05-16 RX ADMIN — Medication 10 ML: at 08:06

## 2018-05-16 RX ADMIN — VITAMIN D, TAB 1000IU (100/BT) 1000 UNITS: 25 TAB at 08:00

## 2018-05-16 RX ADMIN — HYDROCODONE BITARTRATE AND ACETAMINOPHEN 2 TABLET: 5; 325 TABLET ORAL at 04:05

## 2018-05-16 ASSESSMENT — PAIN DESCRIPTION - DESCRIPTORS
DESCRIPTORS: ACHING;SPASM
DESCRIPTORS: ACHING

## 2018-05-16 ASSESSMENT — PAIN DESCRIPTION - PAIN TYPE
TYPE: ACUTE PAIN
TYPE: ACUTE PAIN

## 2018-05-16 ASSESSMENT — PAIN SCALES - GENERAL
PAINLEVEL_OUTOF10: 7
PAINLEVEL_OUTOF10: 3
PAINLEVEL_OUTOF10: 6
PAINLEVEL_OUTOF10: 6

## 2018-05-16 ASSESSMENT — PAIN DESCRIPTION - FREQUENCY: FREQUENCY: CONTINUOUS

## 2018-05-16 ASSESSMENT — PAIN DESCRIPTION - ORIENTATION
ORIENTATION: LEFT
ORIENTATION: LEFT

## 2018-05-16 ASSESSMENT — PAIN DESCRIPTION - LOCATION
LOCATION: HIP
LOCATION: HIP

## 2018-05-16 ASSESSMENT — PAIN DESCRIPTION - PROGRESSION: CLINICAL_PROGRESSION: NOT CHANGED

## 2018-05-16 ASSESSMENT — PAIN DESCRIPTION - ONSET: ONSET: ON-GOING

## 2018-05-18 ENCOUNTER — HOSPITAL ENCOUNTER (OUTPATIENT)
Age: 83
Setting detail: SPECIMEN
Discharge: HOME | End: 2018-05-18
Payer: MEDICARE

## 2018-05-18 ENCOUNTER — HOSPITAL ENCOUNTER (OUTPATIENT)
Dept: INFUSION THERAPY | Age: 83
Discharge: HOME | End: 2018-05-18
Payer: COMMERCIAL

## 2018-05-18 VITALS
SYSTOLIC BLOOD PRESSURE: 149 MMHG | TEMPERATURE: 98.9 F | DIASTOLIC BLOOD PRESSURE: 85 MMHG | RESPIRATION RATE: 16 BRPM | HEART RATE: 100 BPM

## 2018-05-18 PROBLEM — S72.142A CLOSED DISPLACED INTERTROCHANTERIC FRACTURE OF LEFT FEMUR (HCC): Status: ACTIVE | Noted: 2018-05-18

## 2018-05-18 LAB
HCT VFR BLD CALC: 21.3 % (ref 36.3–47.1)
HEMOGLOBIN: 6.9 G/DL (ref 11.9–15.1)
MCH RBC QN AUTO: 31.2 PG (ref 25.2–33.5)
MCHC RBC AUTO-ENTMCNC: 32.4 G/DL (ref 28.4–34.8)
MCV RBC AUTO: 96.4 FL (ref 82.6–102.9)
NRBC AUTOMATED: 0.3 PER 100 WBC
PDW BLD-RTO: 14.5 % (ref 11.8–14.4)
PLATELET # BLD: 190 K/UL (ref 138–453)
PMV BLD AUTO: 9.3 FL (ref 8.1–13.5)
RBC # BLD: 2.21 M/UL (ref 3.95–5.11)
WBC # BLD: 11.4 K/UL (ref 3.5–11.3)

## 2018-05-18 PROCEDURE — 36415 COLL VENOUS BLD VENIPUNCTURE: CPT

## 2018-05-18 PROCEDURE — P9016 RBC LEUKOCYTES REDUCED: HCPCS

## 2018-05-18 PROCEDURE — P9604 ONE-WAY ALLOW PRORATED TRIP: HCPCS

## 2018-05-18 PROCEDURE — 85027 COMPLETE CBC AUTOMATED: CPT

## 2018-05-18 PROCEDURE — 86900 BLOOD TYPING SEROLOGIC ABO: CPT

## 2018-05-18 PROCEDURE — 86901 BLOOD TYPING SEROLOGIC RH(D): CPT

## 2018-05-18 PROCEDURE — 36430 TRANSFUSION BLD/BLD COMPNT: CPT

## 2018-05-18 PROCEDURE — 2580000003 HC RX 258: Performed by: FAMILY MEDICINE

## 2018-05-18 PROCEDURE — 86850 RBC ANTIBODY SCREEN: CPT

## 2018-05-18 PROCEDURE — 86920 COMPATIBILITY TEST SPIN: CPT

## 2018-05-18 RX ORDER — SODIUM CHLORIDE 0.9 % (FLUSH) 0.9 %
10 SYRINGE (ML) INJECTION PRN
Status: DISCONTINUED | OUTPATIENT
Start: 2018-05-18 | End: 2018-05-19 | Stop reason: HOSPADM

## 2018-05-18 RX ORDER — SODIUM CHLORIDE 9 MG/ML
INJECTION, SOLUTION INTRAVENOUS CONTINUOUS
Status: DISCONTINUED | OUTPATIENT
Start: 2018-05-18 | End: 2018-05-19 | Stop reason: HOSPADM

## 2018-05-18 RX ADMIN — Medication 10 ML: at 11:29

## 2018-05-18 RX ADMIN — SODIUM CHLORIDE: 9 INJECTION, SOLUTION INTRAVENOUS at 12:44

## 2018-05-18 NOTE — PLAN OF CARE
Problem: OTHER  Goal: Other  No signs displayed of a allergic reaction,from the blood transfusion.    Outcome: Completed Date Met: 05/18/18

## 2018-05-18 NOTE — PLAN OF CARE
Problem: OTHER  Goal: Other  No signs displayed of a allergic reaction,from the blood transfusion.   Outcome: Ongoing

## 2018-05-18 NOTE — PLAN OF CARE
Problem: KNOWLEDGE DEFICIT  Goal: Patient/S.O. demonstrates understanding of disease process, treatment plan, medications, and discharge instructions.   Outcome: Completed Date Met: 05/18/18

## 2018-05-19 LAB
ABO/RH: NORMAL
ANTIBODY SCREEN: NEGATIVE
ARM BAND NUMBER: NORMAL
BLD PROD TYP BPU: NORMAL
CROSSMATCH RESULT: NORMAL
DISPENSE STATUS BLOOD BANK: NORMAL
EXPIRATION DATE: NORMAL
TRANSFUSION STATUS: NORMAL
UNIT DIVISION: 0
UNIT NUMBER: NORMAL

## 2018-05-22 ENCOUNTER — HOSPITAL ENCOUNTER (OUTPATIENT)
Age: 83
Setting detail: SPECIMEN
Discharge: HOME | End: 2018-05-22
Payer: MEDICARE

## 2018-05-22 LAB
HCT VFR BLD CALC: 27.5 % (ref 36.3–47.1)
HEMOGLOBIN: 8.9 G/DL (ref 11.9–15.1)
MCH RBC QN AUTO: 31.2 PG (ref 25.2–33.5)
MCHC RBC AUTO-ENTMCNC: 32.4 G/DL (ref 28.4–34.8)
MCV RBC AUTO: 96.5 FL (ref 82.6–102.9)
NRBC AUTOMATED: 0 PER 100 WBC
PDW BLD-RTO: 16.3 % (ref 11.8–14.4)
PLATELET # BLD: 269 K/UL (ref 138–453)
PMV BLD AUTO: 9.1 FL (ref 8.1–13.5)
RBC # BLD: 2.85 M/UL (ref 3.95–5.11)
WBC # BLD: 9.5 K/UL (ref 3.5–11.3)

## 2018-05-22 PROCEDURE — 36415 COLL VENOUS BLD VENIPUNCTURE: CPT

## 2018-05-22 PROCEDURE — P9604 ONE-WAY ALLOW PRORATED TRIP: HCPCS

## 2018-05-22 PROCEDURE — 85027 COMPLETE CBC AUTOMATED: CPT

## 2018-06-11 ENCOUNTER — HOSPITAL ENCOUNTER (OUTPATIENT)
Dept: LAB | Age: 83
Discharge: HOME | End: 2018-06-11
Payer: MEDICARE

## 2018-06-11 ENCOUNTER — HOSPITAL ENCOUNTER (OUTPATIENT)
Age: 83
Setting detail: SPECIMEN
Discharge: HOME | End: 2018-06-11
Payer: MEDICARE

## 2018-06-11 LAB
ANION GAP SERPL CALCULATED.3IONS-SCNC: 9 MMOL/L (ref 9–17)
BUN BLDV-MCNC: 22 MG/DL (ref 8–23)
BUN/CREAT BLD: 35 (ref 9–20)
CALCIUM SERPL-MCNC: 9.4 MG/DL (ref 8.6–10.4)
CHLORIDE BLD-SCNC: 101 MMOL/L (ref 98–107)
CO2: 28 MMOL/L (ref 20–31)
CREAT SERPL-MCNC: 0.62 MG/DL (ref 0.5–0.9)
GFR AFRICAN AMERICAN: >60 ML/MIN
GFR NON-AFRICAN AMERICAN: >60 ML/MIN
GFR SERPL CREATININE-BSD FRML MDRD: ABNORMAL ML/MIN/{1.73_M2}
GFR SERPL CREATININE-BSD FRML MDRD: ABNORMAL ML/MIN/{1.73_M2}
GLUCOSE BLD-MCNC: 104 MG/DL (ref 70–99)
HCT VFR BLD CALC: 33.9 % (ref 36.3–47.1)
HEMOGLOBIN: 10.9 G/DL (ref 11.9–15.1)
MCH RBC QN AUTO: 31.8 PG (ref 25.2–33.5)
MCHC RBC AUTO-ENTMCNC: 32.2 G/DL (ref 28.4–34.8)
MCV RBC AUTO: 98.8 FL (ref 82.6–102.9)
NRBC AUTOMATED: 0 PER 100 WBC
PDW BLD-RTO: 13.9 % (ref 11.8–14.4)
PLATELET # BLD: 196 K/UL (ref 138–453)
PMV BLD AUTO: 9.2 FL (ref 8.1–13.5)
POTASSIUM SERPL-SCNC: 4.7 MMOL/L (ref 3.7–5.3)
RBC # BLD: 3.43 M/UL (ref 3.95–5.11)
SODIUM BLD-SCNC: 138 MMOL/L (ref 135–144)
WBC # BLD: 8.5 K/UL (ref 3.5–11.3)

## 2018-06-11 PROCEDURE — 85027 COMPLETE CBC AUTOMATED: CPT

## 2018-06-11 PROCEDURE — 36415 COLL VENOUS BLD VENIPUNCTURE: CPT

## 2018-06-11 PROCEDURE — 80048 BASIC METABOLIC PNL TOTAL CA: CPT

## 2018-06-15 ENCOUNTER — OFFICE VISIT (OUTPATIENT)
Dept: FAMILY MEDICINE CLINIC | Age: 83
End: 2018-06-15
Payer: MEDICARE

## 2018-06-15 VITALS
SYSTOLIC BLOOD PRESSURE: 146 MMHG | WEIGHT: 136 LBS | DIASTOLIC BLOOD PRESSURE: 72 MMHG | BODY MASS INDEX: 23.22 KG/M2 | HEIGHT: 64 IN

## 2018-06-15 DIAGNOSIS — M81.0 OSTEOPOROSIS, UNSPECIFIED OSTEOPOROSIS TYPE, UNSPECIFIED PATHOLOGICAL FRACTURE PRESENCE: ICD-10-CM

## 2018-06-15 DIAGNOSIS — Z87.81 H/O FRACTURE OF LEFT HIP: ICD-10-CM

## 2018-06-15 DIAGNOSIS — D50.9 IRON DEFICIENCY ANEMIA, UNSPECIFIED IRON DEFICIENCY ANEMIA TYPE: Primary | ICD-10-CM

## 2018-06-15 DIAGNOSIS — I87.2 VENOUS INSUFFICIENCY: ICD-10-CM

## 2018-06-15 PROCEDURE — 1036F TOBACCO NON-USER: CPT | Performed by: FAMILY MEDICINE

## 2018-06-15 PROCEDURE — 1123F ACP DISCUSS/DSCN MKR DOCD: CPT | Performed by: FAMILY MEDICINE

## 2018-06-15 PROCEDURE — 1111F DSCHRG MED/CURRENT MED MERGE: CPT | Performed by: FAMILY MEDICINE

## 2018-06-15 PROCEDURE — G8428 CUR MEDS NOT DOCUMENT: HCPCS | Performed by: FAMILY MEDICINE

## 2018-06-15 PROCEDURE — 1090F PRES/ABSN URINE INCON ASSESS: CPT | Performed by: FAMILY MEDICINE

## 2018-06-15 PROCEDURE — G8420 CALC BMI NORM PARAMETERS: HCPCS | Performed by: FAMILY MEDICINE

## 2018-06-15 PROCEDURE — 4040F PNEUMOC VAC/ADMIN/RCVD: CPT | Performed by: FAMILY MEDICINE

## 2018-06-15 PROCEDURE — 99214 OFFICE O/P EST MOD 30 MIN: CPT | Performed by: FAMILY MEDICINE

## 2018-06-15 PROCEDURE — G8400 PT W/DXA NO RESULTS DOC: HCPCS | Performed by: FAMILY MEDICINE

## 2018-07-10 ENCOUNTER — HOSPITAL ENCOUNTER (OUTPATIENT)
Dept: BONE DENSITY | Age: 83
Discharge: HOME | End: 2018-07-12
Payer: MEDICARE

## 2018-07-10 DIAGNOSIS — M81.0 OSTEOPOROSIS, UNSPECIFIED OSTEOPOROSIS TYPE, UNSPECIFIED PATHOLOGICAL FRACTURE PRESENCE: ICD-10-CM

## 2018-07-10 PROCEDURE — 77080 DXA BONE DENSITY AXIAL: CPT

## 2018-07-11 ENCOUNTER — TELEPHONE (OUTPATIENT)
Dept: FAMILY MEDICINE CLINIC | Age: 83
End: 2018-07-11

## 2018-09-01 ENCOUNTER — APPOINTMENT (OUTPATIENT)
Dept: CT IMAGING | Age: 83
End: 2018-09-01
Payer: MEDICARE

## 2018-09-01 ENCOUNTER — APPOINTMENT (OUTPATIENT)
Dept: GENERAL RADIOLOGY | Age: 83
End: 2018-09-01
Payer: MEDICARE

## 2018-09-01 ENCOUNTER — ANESTHESIA EVENT (OUTPATIENT)
Dept: OPERATING ROOM | Age: 83
End: 2018-09-01
Payer: MEDICARE

## 2018-09-01 ENCOUNTER — ANESTHESIA (OUTPATIENT)
Dept: OPERATING ROOM | Age: 83
End: 2018-09-01
Payer: MEDICARE

## 2018-09-01 ENCOUNTER — HOSPITAL ENCOUNTER (OUTPATIENT)
Age: 83
Discharge: HOME | End: 2018-09-02
Attending: SURGERY | Admitting: SURGERY
Payer: MEDICARE

## 2018-09-01 VITALS
TEMPERATURE: 96.5 F | OXYGEN SATURATION: 99 % | DIASTOLIC BLOOD PRESSURE: 97 MMHG | SYSTOLIC BLOOD PRESSURE: 171 MMHG | RESPIRATION RATE: 8 BRPM

## 2018-09-01 DIAGNOSIS — Z90.49 S/P LAPAROSCOPIC APPENDECTOMY: ICD-10-CM

## 2018-09-01 DIAGNOSIS — K35.80 ACUTE APPENDICITIS, UNSPECIFIED ACUTE APPENDICITIS TYPE: Primary | ICD-10-CM

## 2018-09-01 LAB
ABSOLUTE EOS #: 0.05 K/UL (ref 0–0.44)
ABSOLUTE IMMATURE GRANULOCYTE: 0.05 K/UL (ref 0–0.3)
ABSOLUTE LYMPH #: 1.08 K/UL (ref 1.1–3.7)
ABSOLUTE MONO #: 0.5 K/UL (ref 0.1–1.2)
ALBUMIN SERPL-MCNC: 4.2 G/DL (ref 3.5–5.2)
ALBUMIN/GLOBULIN RATIO: 1.2 (ref 1–2.5)
ALP BLD-CCNC: 110 U/L (ref 35–104)
ALT SERPL-CCNC: 15 U/L (ref 5–33)
ANION GAP SERPL CALCULATED.3IONS-SCNC: 12 MMOL/L (ref 9–17)
AST SERPL-CCNC: 19 U/L
BASOPHILS # BLD: 1 % (ref 0–2)
BASOPHILS ABSOLUTE: 0.06 K/UL (ref 0–0.2)
BILIRUB SERPL-MCNC: 0.44 MG/DL (ref 0.3–1.2)
BILIRUBIN URINE: NEGATIVE
BUN BLDV-MCNC: 20 MG/DL (ref 8–23)
BUN/CREAT BLD: 30 (ref 9–20)
CALCIUM SERPL-MCNC: 10.2 MG/DL (ref 8.6–10.4)
CHLORIDE BLD-SCNC: 102 MMOL/L (ref 98–107)
CO2: 28 MMOL/L (ref 20–31)
COLOR: YELLOW
COMMENT UA: NORMAL
CREAT SERPL-MCNC: 0.66 MG/DL (ref 0.5–0.9)
DIFFERENTIAL TYPE: ABNORMAL
EKG ATRIAL RATE: 68 BPM
EKG P AXIS: 47 DEGREES
EKG P-R INTERVAL: 170 MS
EKG Q-T INTERVAL: 398 MS
EKG QRS DURATION: 88 MS
EKG QTC CALCULATION (BAZETT): 423 MS
EKG R AXIS: 8 DEGREES
EKG T AXIS: 60 DEGREES
EKG VENTRICULAR RATE: 68 BPM
EOSINOPHILS RELATIVE PERCENT: 0 % (ref 1–4)
GFR AFRICAN AMERICAN: >60 ML/MIN
GFR NON-AFRICAN AMERICAN: >60 ML/MIN
GFR SERPL CREATININE-BSD FRML MDRD: ABNORMAL ML/MIN/{1.73_M2}
GFR SERPL CREATININE-BSD FRML MDRD: ABNORMAL ML/MIN/{1.73_M2}
GLUCOSE BLD-MCNC: 102 MG/DL (ref 70–99)
GLUCOSE URINE: NEGATIVE
HCT VFR BLD CALC: 38.5 % (ref 36.3–47.1)
HEMOGLOBIN: 12.6 G/DL (ref 11.9–15.1)
IMMATURE GRANULOCYTES: 0 %
KETONES, URINE: NEGATIVE
LEUKOCYTE ESTERASE, URINE: NEGATIVE
LIPASE: 48 U/L (ref 13–60)
LYMPHOCYTES # BLD: 9 % (ref 24–43)
MCH RBC QN AUTO: 30.7 PG (ref 25.2–33.5)
MCHC RBC AUTO-ENTMCNC: 32.7 G/DL (ref 28.4–34.8)
MCV RBC AUTO: 93.9 FL (ref 82.6–102.9)
MONOCYTES # BLD: 4 % (ref 3–12)
NITRITE, URINE: NEGATIVE
NRBC AUTOMATED: 0 PER 100 WBC
PDW BLD-RTO: 12.8 % (ref 11.8–14.4)
PH UA: 7.5 (ref 5–9)
PLATELET # BLD: 230 K/UL (ref 138–453)
PLATELET ESTIMATE: ABNORMAL
PMV BLD AUTO: 9.7 FL (ref 8.1–13.5)
POTASSIUM SERPL-SCNC: 4.3 MMOL/L (ref 3.7–5.3)
PROTEIN UA: NEGATIVE
RBC # BLD: 4.1 M/UL (ref 3.95–5.11)
RBC # BLD: ABNORMAL 10*6/UL
SEG NEUTROPHILS: 86 % (ref 36–65)
SEGMENTED NEUTROPHILS ABSOLUTE COUNT: 9.86 K/UL (ref 1.5–8.1)
SODIUM BLD-SCNC: 142 MMOL/L (ref 135–144)
SPECIFIC GRAVITY UA: 1.02 (ref 1.01–1.02)
TOTAL PROTEIN: 7.8 G/DL (ref 6.4–8.3)
TURBIDITY: CLEAR
URINE HGB: NEGATIVE
UROBILINOGEN, URINE: NORMAL
WBC # BLD: 11.6 K/UL (ref 3.5–11.3)
WBC # BLD: ABNORMAL 10*3/UL

## 2018-09-01 PROCEDURE — 99285 EMERGENCY DEPT VISIT HI MDM: CPT

## 2018-09-01 PROCEDURE — 71046 X-RAY EXAM CHEST 2 VIEWS: CPT

## 2018-09-01 PROCEDURE — 2500000003 HC RX 250 WO HCPCS: Performed by: NURSE ANESTHETIST, CERTIFIED REGISTERED

## 2018-09-01 PROCEDURE — 3600000014 HC SURGERY LEVEL 4 ADDTL 15MIN: Performed by: SURGERY

## 2018-09-01 PROCEDURE — 2780000010 HC IMPLANT OTHER: Performed by: SURGERY

## 2018-09-01 PROCEDURE — 6370000000 HC RX 637 (ALT 250 FOR IP): Performed by: SURGERY

## 2018-09-01 PROCEDURE — 81003 URINALYSIS AUTO W/O SCOPE: CPT

## 2018-09-01 PROCEDURE — 2709999900 HC NON-CHARGEABLE SUPPLY: Performed by: SURGERY

## 2018-09-01 PROCEDURE — 3600000004 HC SURGERY LEVEL 4 BASE: Performed by: SURGERY

## 2018-09-01 PROCEDURE — 80053 COMPREHEN METABOLIC PANEL: CPT

## 2018-09-01 PROCEDURE — 96365 THER/PROPH/DIAG IV INF INIT: CPT

## 2018-09-01 PROCEDURE — 74176 CT ABD & PELVIS W/O CONTRAST: CPT

## 2018-09-01 PROCEDURE — 85025 COMPLETE CBC W/AUTO DIFF WBC: CPT

## 2018-09-01 PROCEDURE — 76942 ECHO GUIDE FOR BIOPSY: CPT | Performed by: NURSE ANESTHETIST, CERTIFIED REGISTERED

## 2018-09-01 PROCEDURE — 96375 TX/PRO/DX INJ NEW DRUG ADDON: CPT

## 2018-09-01 PROCEDURE — 2580000003 HC RX 258: Performed by: NURSE ANESTHETIST, CERTIFIED REGISTERED

## 2018-09-01 PROCEDURE — 2580000003 HC RX 258: Performed by: PHYSICIAN ASSISTANT

## 2018-09-01 PROCEDURE — 3700000000 HC ANESTHESIA ATTENDED CARE: Performed by: SURGERY

## 2018-09-01 PROCEDURE — 3700000001 HC ADD 15 MINUTES (ANESTHESIA): Performed by: SURGERY

## 2018-09-01 PROCEDURE — 99221 1ST HOSP IP/OBS SF/LOW 40: CPT | Performed by: SURGERY

## 2018-09-01 PROCEDURE — 2720000010 HC SURG SUPPLY STERILE: Performed by: SURGERY

## 2018-09-01 PROCEDURE — 93005 ELECTROCARDIOGRAM TRACING: CPT

## 2018-09-01 PROCEDURE — 7100000001 HC PACU RECOVERY - ADDTL 15 MIN: Performed by: SURGERY

## 2018-09-01 PROCEDURE — 88304 TISSUE EXAM BY PATHOLOGIST: CPT

## 2018-09-01 PROCEDURE — 83690 ASSAY OF LIPASE: CPT

## 2018-09-01 PROCEDURE — 6360000002 HC RX W HCPCS: Performed by: NURSE ANESTHETIST, CERTIFIED REGISTERED

## 2018-09-01 PROCEDURE — 6360000002 HC RX W HCPCS: Performed by: PHYSICIAN ASSISTANT

## 2018-09-01 PROCEDURE — 7100000000 HC PACU RECOVERY - FIRST 15 MIN: Performed by: SURGERY

## 2018-09-01 DEVICE — RELOAD STPL H41X2MM DIA45MM GRN THCK TISS NAT ARTC B FORM: Type: IMPLANTABLE DEVICE | Site: ABDOMEN | Status: FUNCTIONAL

## 2018-09-01 DEVICE — DISCONTINUED USE 399249 CS RELOAD ECHELON WHITE 45MM: Type: IMPLANTABLE DEVICE | Site: ABDOMEN | Status: FUNCTIONAL

## 2018-09-01 RX ORDER — SODIUM CHLORIDE 0.9 % (FLUSH) 0.9 %
10 SYRINGE (ML) INJECTION EVERY 12 HOURS SCHEDULED
Status: DISCONTINUED | OUTPATIENT
Start: 2018-09-01 | End: 2018-09-01 | Stop reason: HOSPADM

## 2018-09-01 RX ORDER — ACETAMINOPHEN 10 MG/ML
INJECTION, SOLUTION INTRAVENOUS PRN
Status: DISCONTINUED | OUTPATIENT
Start: 2018-09-01 | End: 2018-09-01 | Stop reason: SDUPTHER

## 2018-09-01 RX ORDER — MULTIVIT WITH MINERALS/LUTEIN
250 TABLET ORAL DAILY
COMMUNITY

## 2018-09-01 RX ORDER — EPHEDRINE SULFATE 50 MG/ML
INJECTION, SOLUTION INTRAVENOUS PRN
Status: DISCONTINUED | OUTPATIENT
Start: 2018-09-01 | End: 2018-09-01 | Stop reason: SDUPTHER

## 2018-09-01 RX ORDER — DEXAMETHASONE SODIUM PHOSPHATE 10 MG/ML
INJECTION INTRAMUSCULAR; INTRAVENOUS PRN
Status: DISCONTINUED | OUTPATIENT
Start: 2018-09-01 | End: 2018-09-01 | Stop reason: SDUPTHER

## 2018-09-01 RX ORDER — SODIUM CHLORIDE 9 MG/ML
INJECTION, SOLUTION INTRAVENOUS CONTINUOUS
Status: DISCONTINUED | OUTPATIENT
Start: 2018-09-01 | End: 2018-09-01

## 2018-09-01 RX ORDER — SODIUM CHLORIDE 9 MG/ML
INJECTION, SOLUTION INTRAVENOUS CONTINUOUS PRN
Status: DISCONTINUED | OUTPATIENT
Start: 2018-09-01 | End: 2018-09-01 | Stop reason: SDUPTHER

## 2018-09-01 RX ORDER — FENTANYL CITRATE 50 UG/ML
50 INJECTION, SOLUTION INTRAMUSCULAR; INTRAVENOUS EVERY 5 MIN PRN
Status: DISCONTINUED | OUTPATIENT
Start: 2018-09-01 | End: 2018-09-01

## 2018-09-01 RX ORDER — ACETAMINOPHEN 325 MG/1
650 TABLET ORAL EVERY 4 HOURS PRN
Status: DISCONTINUED | OUTPATIENT
Start: 2018-09-01 | End: 2018-09-02 | Stop reason: HOSPADM

## 2018-09-01 RX ORDER — LABETALOL HYDROCHLORIDE 5 MG/ML
5 INJECTION, SOLUTION INTRAVENOUS EVERY 10 MIN PRN
Status: DISCONTINUED | OUTPATIENT
Start: 2018-09-01 | End: 2018-09-01

## 2018-09-01 RX ORDER — METOCLOPRAMIDE HYDROCHLORIDE 5 MG/ML
10 INJECTION INTRAMUSCULAR; INTRAVENOUS
Status: DISCONTINUED | OUTPATIENT
Start: 2018-09-01 | End: 2018-09-01

## 2018-09-01 RX ORDER — SODIUM CHLORIDE, SODIUM LACTATE, POTASSIUM CHLORIDE, CALCIUM CHLORIDE 600; 310; 30; 20 MG/100ML; MG/100ML; MG/100ML; MG/100ML
INJECTION, SOLUTION INTRAVENOUS ONCE
Status: COMPLETED | OUTPATIENT
Start: 2018-09-01 | End: 2018-09-01

## 2018-09-01 RX ORDER — MORPHINE SULFATE 2 MG/ML
1 INJECTION, SOLUTION INTRAMUSCULAR; INTRAVENOUS
Status: DISCONTINUED | OUTPATIENT
Start: 2018-09-01 | End: 2018-09-02 | Stop reason: HOSPADM

## 2018-09-01 RX ORDER — HYDROCODONE BITARTRATE AND ACETAMINOPHEN 5; 325 MG/1; MG/1
1 TABLET ORAL EVERY 4 HOURS PRN
Status: DISCONTINUED | OUTPATIENT
Start: 2018-09-01 | End: 2018-09-02 | Stop reason: HOSPADM

## 2018-09-01 RX ORDER — HYDROCODONE BITARTRATE AND ACETAMINOPHEN 5; 325 MG/1; MG/1
1 TABLET ORAL EVERY 6 HOURS PRN
Qty: 20 TABLET | Refills: 0 | Status: SHIPPED | OUTPATIENT
Start: 2018-09-01 | End: 2018-09-04

## 2018-09-01 RX ORDER — ONDANSETRON 2 MG/ML
4 INJECTION INTRAMUSCULAR; INTRAVENOUS EVERY 6 HOURS PRN
Status: DISCONTINUED | OUTPATIENT
Start: 2018-09-01 | End: 2018-09-02 | Stop reason: HOSPADM

## 2018-09-01 RX ORDER — PROPOFOL 10 MG/ML
INJECTION, EMULSION INTRAVENOUS PRN
Status: DISCONTINUED | OUTPATIENT
Start: 2018-09-01 | End: 2018-09-01 | Stop reason: SDUPTHER

## 2018-09-01 RX ORDER — DEXAMETHASONE SODIUM PHOSPHATE 4 MG/ML
INJECTION, SOLUTION INTRA-ARTICULAR; INTRALESIONAL; INTRAMUSCULAR; INTRAVENOUS; SOFT TISSUE PRN
Status: DISCONTINUED | OUTPATIENT
Start: 2018-09-01 | End: 2018-09-01 | Stop reason: SDUPTHER

## 2018-09-01 RX ORDER — SODIUM CHLORIDE 0.9 % (FLUSH) 0.9 %
10 SYRINGE (ML) INJECTION EVERY 12 HOURS SCHEDULED
Status: DISCONTINUED | OUTPATIENT
Start: 2018-09-01 | End: 2018-09-02 | Stop reason: HOSPADM

## 2018-09-01 RX ORDER — FENTANYL CITRATE 50 UG/ML
INJECTION, SOLUTION INTRAMUSCULAR; INTRAVENOUS PRN
Status: DISCONTINUED | OUTPATIENT
Start: 2018-09-01 | End: 2018-09-01 | Stop reason: SDUPTHER

## 2018-09-01 RX ORDER — SODIUM CHLORIDE, SODIUM LACTATE, POTASSIUM CHLORIDE, CALCIUM CHLORIDE 600; 310; 30; 20 MG/100ML; MG/100ML; MG/100ML; MG/100ML
INJECTION, SOLUTION INTRAVENOUS CONTINUOUS
Status: DISCONTINUED | OUTPATIENT
Start: 2018-09-01 | End: 2018-09-01

## 2018-09-01 RX ORDER — ONDANSETRON 2 MG/ML
4 INJECTION INTRAMUSCULAR; INTRAVENOUS ONCE
Status: COMPLETED | OUTPATIENT
Start: 2018-09-01 | End: 2018-09-01

## 2018-09-01 RX ORDER — SODIUM CHLORIDE 0.9 % (FLUSH) 0.9 %
SYRINGE (ML) INJECTION PRN
Status: DISCONTINUED | OUTPATIENT
Start: 2018-09-01 | End: 2018-09-01 | Stop reason: SDUPTHER

## 2018-09-01 RX ORDER — FENTANYL CITRATE 50 UG/ML
25 INJECTION, SOLUTION INTRAMUSCULAR; INTRAVENOUS EVERY 5 MIN PRN
Status: DISCONTINUED | OUTPATIENT
Start: 2018-09-01 | End: 2018-09-01

## 2018-09-01 RX ORDER — MORPHINE SULFATE 4 MG/ML
4 INJECTION, SOLUTION INTRAMUSCULAR; INTRAVENOUS ONCE
Status: COMPLETED | OUTPATIENT
Start: 2018-09-01 | End: 2018-09-01

## 2018-09-01 RX ORDER — SUCCINYLCHOLINE CHLORIDE 20 MG/ML
INJECTION INTRAMUSCULAR; INTRAVENOUS PRN
Status: DISCONTINUED | OUTPATIENT
Start: 2018-09-01 | End: 2018-09-01 | Stop reason: SDUPTHER

## 2018-09-01 RX ORDER — ROCURONIUM BROMIDE 10 MG/ML
INJECTION, SOLUTION INTRAVENOUS PRN
Status: DISCONTINUED | OUTPATIENT
Start: 2018-09-01 | End: 2018-09-01 | Stop reason: SDUPTHER

## 2018-09-01 RX ORDER — SODIUM CHLORIDE 0.9 % (FLUSH) 0.9 %
10 SYRINGE (ML) INJECTION PRN
Status: DISCONTINUED | OUTPATIENT
Start: 2018-09-01 | End: 2018-09-02 | Stop reason: HOSPADM

## 2018-09-01 RX ORDER — ROPIVACAINE HYDROCHLORIDE 5 MG/ML
INJECTION, SOLUTION EPIDURAL; INFILTRATION; PERINEURAL PRN
Status: DISCONTINUED | OUTPATIENT
Start: 2018-09-01 | End: 2018-09-01 | Stop reason: SDUPTHER

## 2018-09-01 RX ORDER — SODIUM CHLORIDE 0.9 % (FLUSH) 0.9 %
10 SYRINGE (ML) INJECTION PRN
Status: DISCONTINUED | OUTPATIENT
Start: 2018-09-01 | End: 2018-09-01 | Stop reason: HOSPADM

## 2018-09-01 RX ORDER — SODIUM CHLORIDE 9 MG/ML
INJECTION, SOLUTION INTRAVENOUS CONTINUOUS
Status: DISCONTINUED | OUTPATIENT
Start: 2018-09-01 | End: 2018-09-02 | Stop reason: HOSPADM

## 2018-09-01 RX ORDER — SODIUM CHLORIDE, SODIUM LACTATE, POTASSIUM CHLORIDE, CALCIUM CHLORIDE 600; 310; 30; 20 MG/100ML; MG/100ML; MG/100ML; MG/100ML
INJECTION, SOLUTION INTRAVENOUS CONTINUOUS PRN
Status: DISCONTINUED | OUTPATIENT
Start: 2018-09-01 | End: 2018-09-01 | Stop reason: SDUPTHER

## 2018-09-01 RX ORDER — LIDOCAINE HYDROCHLORIDE 20 MG/ML
INJECTION, SOLUTION INFILTRATION; PERINEURAL PRN
Status: DISCONTINUED | OUTPATIENT
Start: 2018-09-01 | End: 2018-09-01 | Stop reason: SDUPTHER

## 2018-09-01 RX ORDER — OXYCODONE HYDROCHLORIDE 5 MG/1
5 TABLET ORAL
Status: DISCONTINUED | OUTPATIENT
Start: 2018-09-01 | End: 2018-09-01

## 2018-09-01 RX ORDER — HYDROCODONE BITARTRATE AND ACETAMINOPHEN 5; 325 MG/1; MG/1
2 TABLET ORAL EVERY 4 HOURS PRN
Status: DISCONTINUED | OUTPATIENT
Start: 2018-09-01 | End: 2018-09-02 | Stop reason: HOSPADM

## 2018-09-01 RX ADMIN — SODIUM CHLORIDE, PRESERVATIVE FREE 5 ML: 5 INJECTION INTRAVENOUS at 14:33

## 2018-09-01 RX ADMIN — ROPIVACAINE HYDROCHLORIDE 15 ML: 5 INJECTION, SOLUTION EPIDURAL; INFILTRATION; PERINEURAL at 14:30

## 2018-09-01 RX ADMIN — PIPERACILLIN SODIUM AND TAZOBACTAM SODIUM 3.38 G: 3; .375 INJECTION, POWDER, LYOPHILIZED, FOR SOLUTION INTRAVENOUS at 13:10

## 2018-09-01 RX ADMIN — PHENYLEPHRINE HYDROCHLORIDE 50 MCG: 10 INJECTION INTRAVENOUS at 15:19

## 2018-09-01 RX ADMIN — DEXAMETHASONE SODIUM PHOSPHATE 5 MG: 10 INJECTION INTRAMUSCULAR; INTRAVENOUS at 14:33

## 2018-09-01 RX ADMIN — LIDOCAINE HYDROCHLORIDE 10 MG: 20 INJECTION, SOLUTION INFILTRATION; PERINEURAL at 14:23

## 2018-09-01 RX ADMIN — ONDANSETRON 4 MG: 2 INJECTION INTRAMUSCULAR; INTRAVENOUS at 13:09

## 2018-09-01 RX ADMIN — PHENYLEPHRINE HYDROCHLORIDE 100 MCG: 10 INJECTION INTRAVENOUS at 14:33

## 2018-09-01 RX ADMIN — SUCCINYLCHOLINE CHLORIDE 100 MG: 20 INJECTION, SOLUTION INTRAMUSCULAR; INTRAVENOUS at 14:23

## 2018-09-01 RX ADMIN — PHENYLEPHRINE HYDROCHLORIDE 50 MCG: 10 INJECTION INTRAVENOUS at 14:49

## 2018-09-01 RX ADMIN — DEXAMETHASONE SODIUM PHOSPHATE 5 MG: 10 INJECTION INTRAMUSCULAR; INTRAVENOUS at 14:30

## 2018-09-01 RX ADMIN — FENTANYL CITRATE 50 MCG: 50 INJECTION INTRAMUSCULAR; INTRAVENOUS at 14:23

## 2018-09-01 RX ADMIN — SODIUM CHLORIDE, POTASSIUM CHLORIDE, SODIUM LACTATE AND CALCIUM CHLORIDE: 600; 310; 30; 20 INJECTION, SOLUTION INTRAVENOUS at 13:30

## 2018-09-01 RX ADMIN — Medication 25 MG: at 14:27

## 2018-09-01 RX ADMIN — SODIUM CHLORIDE, PRESERVATIVE FREE 5 ML: 5 INJECTION INTRAVENOUS at 14:30

## 2018-09-01 RX ADMIN — PROPOFOL 120 MG: 10 INJECTION, EMULSION INTRAVENOUS at 14:23

## 2018-09-01 RX ADMIN — PHENYLEPHRINE HYDROCHLORIDE 50 MCG: 10 INJECTION INTRAVENOUS at 15:24

## 2018-09-01 RX ADMIN — PHENYLEPHRINE HYDROCHLORIDE 50 MCG: 10 INJECTION INTRAVENOUS at 14:40

## 2018-09-01 RX ADMIN — SODIUM CHLORIDE: 9 INJECTION, SOLUTION INTRAVENOUS at 14:19

## 2018-09-01 RX ADMIN — PHENYLEPHRINE HYDROCHLORIDE 50 MCG: 10 INJECTION INTRAVENOUS at 15:12

## 2018-09-01 RX ADMIN — DEXAMETHASONE SODIUM PHOSPHATE 8 MG: 4 INJECTION, SOLUTION INTRAMUSCULAR; INTRAVENOUS at 14:57

## 2018-09-01 RX ADMIN — SODIUM CHLORIDE, POTASSIUM CHLORIDE, SODIUM LACTATE AND CALCIUM CHLORIDE: 600; 310; 30; 20 INJECTION, SOLUTION INTRAVENOUS at 15:01

## 2018-09-01 RX ADMIN — EPHEDRINE SULFATE 5 MG: 50 INJECTION INTRAMUSCULAR; INTRAVENOUS; SUBCUTANEOUS at 14:47

## 2018-09-01 RX ADMIN — SODIUM CHLORIDE: 9 INJECTION, SOLUTION INTRAVENOUS at 11:20

## 2018-09-01 RX ADMIN — MORPHINE SULFATE 4 MG: 4 INJECTION, SOLUTION INTRAMUSCULAR; INTRAVENOUS at 13:10

## 2018-09-01 RX ADMIN — ACETAMINOPHEN 1000 MG: 10 INJECTION, SOLUTION INTRAVENOUS at 14:40

## 2018-09-01 RX ADMIN — HYDROCODONE BITARTRATE AND ACETAMINOPHEN 1 TABLET: 5; 325 TABLET ORAL at 23:25

## 2018-09-01 RX ADMIN — EPHEDRINE SULFATE 5 MG: 50 INJECTION INTRAMUSCULAR; INTRAVENOUS; SUBCUTANEOUS at 14:49

## 2018-09-01 RX ADMIN — FENTANYL CITRATE 50 MCG: 50 INJECTION INTRAMUSCULAR; INTRAVENOUS at 14:52

## 2018-09-01 RX ADMIN — ROPIVACAINE HYDROCHLORIDE 15 ML: 5 INJECTION, SOLUTION EPIDURAL; INFILTRATION; PERINEURAL at 14:33

## 2018-09-01 ASSESSMENT — PAIN DESCRIPTION - PAIN TYPE
TYPE: SURGICAL PAIN
TYPE: SURGICAL PAIN
TYPE: ACUTE PAIN
TYPE: SURGICAL PAIN

## 2018-09-01 ASSESSMENT — PULMONARY FUNCTION TESTS
PIF_VALUE: 29
PIF_VALUE: 28
PIF_VALUE: 16
PIF_VALUE: 26
PIF_VALUE: 29
PIF_VALUE: 16
PIF_VALUE: 27
PIF_VALUE: 28
PIF_VALUE: 15
PIF_VALUE: 29
PIF_VALUE: 16
PIF_VALUE: 16
PIF_VALUE: 27
PIF_VALUE: 6
PIF_VALUE: 2
PIF_VALUE: 28
PIF_VALUE: 28
PIF_VALUE: 29
PIF_VALUE: 29
PIF_VALUE: 28
PIF_VALUE: 16
PIF_VALUE: 28
PIF_VALUE: 29
PIF_VALUE: 16
PIF_VALUE: 0
PIF_VALUE: 15
PIF_VALUE: 2
PIF_VALUE: 20
PIF_VALUE: 2
PIF_VALUE: 15
PIF_VALUE: 16
PIF_VALUE: 15
PIF_VALUE: 29
PIF_VALUE: 27
PIF_VALUE: 28
PIF_VALUE: 13
PIF_VALUE: 28
PIF_VALUE: 15
PIF_VALUE: 26
PIF_VALUE: 16
PIF_VALUE: 0
PIF_VALUE: 16
PIF_VALUE: 21
PIF_VALUE: 29
PIF_VALUE: 3
PIF_VALUE: 17
PIF_VALUE: 16
PIF_VALUE: 15
PIF_VALUE: 29
PIF_VALUE: 14
PIF_VALUE: 13
PIF_VALUE: 0
PIF_VALUE: 16
PIF_VALUE: 28
PIF_VALUE: 20
PIF_VALUE: 17
PIF_VALUE: 29
PIF_VALUE: 16
PIF_VALUE: 15
PIF_VALUE: 15
PIF_VALUE: 26
PIF_VALUE: 27
PIF_VALUE: 29
PIF_VALUE: 1
PIF_VALUE: 28
PIF_VALUE: 29
PIF_VALUE: 28
PIF_VALUE: 29
PIF_VALUE: 1
PIF_VALUE: 13
PIF_VALUE: 14
PIF_VALUE: 5
PIF_VALUE: 19
PIF_VALUE: 17
PIF_VALUE: 28
PIF_VALUE: 29
PIF_VALUE: 27
PIF_VALUE: 29

## 2018-09-01 ASSESSMENT — PAIN SCALES - GENERAL
PAINLEVEL_OUTOF10: 3
PAINLEVEL_OUTOF10: 0
PAINLEVEL_OUTOF10: 0
PAINLEVEL_OUTOF10: 3
PAINLEVEL_OUTOF10: 4
PAINLEVEL_OUTOF10: 1
PAINLEVEL_OUTOF10: 5
PAINLEVEL_OUTOF10: 0
PAINLEVEL_OUTOF10: 0
PAINLEVEL_OUTOF10: 8
PAINLEVEL_OUTOF10: 3
PAINLEVEL_OUTOF10: 2
PAINLEVEL_OUTOF10: 3

## 2018-09-01 ASSESSMENT — ENCOUNTER SYMPTOMS
COUGH: 0
NAUSEA: 1
DIARRHEA: 0
ABDOMINAL PAIN: 1
BLOOD IN STOOL: 0
EYE REDNESS: 0
VOMITING: 1
SORE THROAT: 0
BACK PAIN: 0
SHORTNESS OF BREATH: 0

## 2018-09-01 ASSESSMENT — PAIN DESCRIPTION - LOCATION
LOCATION: ABDOMEN

## 2018-09-01 ASSESSMENT — PAIN - FUNCTIONAL ASSESSMENT: PAIN_FUNCTIONAL_ASSESSMENT: 0-10

## 2018-09-01 ASSESSMENT — PAIN DESCRIPTION - DESCRIPTORS
DESCRIPTORS: ACHING
DESCRIPTORS: CRAMPING

## 2018-09-01 ASSESSMENT — PAIN DESCRIPTION - ORIENTATION
ORIENTATION: RIGHT;UPPER
ORIENTATION: UPPER
ORIENTATION: LOWER;RIGHT;LEFT

## 2018-09-01 ASSESSMENT — PAIN DESCRIPTION - FREQUENCY
FREQUENCY: CONTINUOUS
FREQUENCY: INTERMITTENT

## 2018-09-01 NOTE — PLAN OF CARE
Problem: Pain:  Goal: Pain level will decrease  Pain level will decrease   Outcome: Ongoing  Continuing to monitor/assess pain at least every 4 hours. Informed patient of adverse complications of uncontrolled pain. Verbalized understanding and agrees to report increases in pain level. Educated that pain medication addiction risk is greatly reduced when medication is used for acute pain on a short term basis. Plan patient's day so activities occur at the peak level of medication. Will be medicated before procedures and activities that increase pain so to prevent uncontrollable pain. Will provide distractions such as TV, music, reading, and/or visitors. Will inform physician of ineffective pain control. Will continue to monitor and medicate as ordered. Problem: Sensory:  Goal: Pain level will decrease  Pain level will decrease   Outcome: Ongoing  Continuing to monitor/assess pain at least every 4 hours. Informed patient of adverse complications of uncontrolled pain. Verbalized understanding and agrees to report increases in pain level. Educated that pain medication addiction risk is greatly reduced when medication is used for acute pain on a short term basis. Plan patient's day so activities occur at the peak level of medication. Will be medicated before procedures and activities that increase pain so to prevent uncontrollable pain. Will provide distractions such as TV, music, reading, and/or visitors. Will inform physician of ineffective pain control. Will continue to monitor and medicate as ordered. Problem: Safety:  Goal: Free from accidental physical injury  Free from accidental physical injury  Outcome: Ongoing  Patient assessed for fall risk and fall precautions initiated as needed. Patient instructed about safety devices and allowed to make decisions related to safey. Environment kept free of clutter and adequate lighting provided. Bed in lowest position with brakes locked. Call light in reach.

## 2018-09-01 NOTE — ANESTHESIA POSTPROCEDURE EVALUATION
Department of Anesthesiology  Postprocedure Note    Patient: Marine Melendez  MRN: [id number]  [place]: 11/12/1932  Date of evaluation: 9/1/2018  Time:  3:49 PM     Procedure Summary     Date:  09/01/18 Room / Location:  [facility] OR [address] [place] [address] Pay OR    Anesthesia Start:  1419 Anesthesia Stop:      Procedure:  APPENDECTOMY LAPAROSCOPIC (N/A ) Diagnosis:  (ACUTE APPENDICITIS)    Surgeon:  Pranav Mansfield MD Responsible Provider:  TAYLOR Morris CRNA    Anesthesia Type:  general ASA Status:  2          Anesthesia Type: general    Shikha Phase I:      Shikha Phase II:      Last vitals: Reviewed and per EMR flowsheets.        Anesthesia Post Evaluation

## 2018-09-01 NOTE — ANESTHESIA PRE PROCEDURE
Endo/Other ROS                    Abdominal:           Vascular: negative vascular ROS. Anesthesia Plan      general     ASA 2     (Discussed PNB with pt, consented)  Induction: intravenous. Anesthetic plan and risks discussed with patient and sibling.                       TAYLOR Whittington - CRNA   9/1/2018

## 2018-09-01 NOTE — ANESTHESIA POSTPROCEDURE EVALUATION
Department of Anesthesiology  Postprocedure Note    Patient: Lena Townsend  MRN: [id number]  [place]: 11/12/1932  Date of evaluation: 9/1/2018  Time:  3:55 PM     Procedure Summary     Date:  09/01/18 Room / Location:  [place] [address] / [facility] AT [facility]    Anesthesia Start:  2907 Anesthesia Stop:  2526    Procedure:  APPENDECTOMY LAPAROSCOPIC (N/A ) Diagnosis:  (ACUTE APPENDICITIS)    Surgeon:  Liyah Felton MD Responsible Provider:  TAYLOR Linares Res, CRNA    Anesthesia Type:  general ASA Status:  2          Anesthesia Type: general    Shikha Phase I: Shikha Score: 8    Shikha Phase II:      Last vitals: Reviewed and per EMR flowsheets.        Anesthesia Post Evaluation    Patient location during evaluation: PACU  Patient participation: complete - patient participated  Level of consciousness: awake and alert  Pain score: 3  Airway patency: patent  Nausea & Vomiting: no nausea and no vomiting  Complications: no  Cardiovascular status: blood pressure returned to baseline  Respiratory status: acceptable  Hydration status: euvolemic

## 2018-09-01 NOTE — BRIEF OP NOTE
Brief Postoperative Note  ______________________________________________________________    Patient: Kam Acosta  YOB: 1932  MRN: [id number]  Date of Procedure: 9/1/2018    Pre-Op Diagnosis:     1. Acute appendicitis    Post-Op Diagnosis:      1. Acute appendicitis       Procedure(s):      1. Laparoscopic appendectomy    Anesthesia: Anesthesia type not filed in the log. Surgeon(s):  Zachary Terrazas MD    Staff:  Scrub Person First: Francisco J Barlow LPN     Estimated Blood Loss:  Less than 99SO    Complications: None    Specimens:     1. Appendix    Findings:  As above.     Dictated # [id number]    Zachary Terrazas MD  Date: 9/1/2018  Time: 2:18 PM

## 2018-09-01 NOTE — ED PROVIDER NOTES
[facility] ED  eMERGENCY dEPARTMENT eNCOUnter      Pt Name: Frances Navarro  MRN: [id number]  [place] 11/12/1932  Date of evaluation: 9/1/2018  Provider: Juanita Bailey PA-C,    CHIEF COMPLAINT       Chief Complaint   Patient presents with    Abdominal Pain     c/o lower abdominal pain; pt states she had a bm and thinks there may have been blood in it    Nausea       HISTORY OF PRESENT ILLNESS    Frances Navarro is a 80 y.o. female who presents to the emergency department from home Complains of upper abdominal pain which started last evening take states times in her lower abdomen also. Patient states pain is a achy aggravating or alleviating factors. Patient did have some dry heaves last evening soft stools but no diarrhea. Patient has any flank pain, urinary symptoms known fevers or chills, syncope or other complaints at present. Triage notes and Nursing notes were reviewed by myself. Any discrepancies are addressed above. PAST MEDICAL HISTORY     Past Medical History:   Diagnosis Date    Asthma     Eczema     History of femur fracture 11/2014    left, hairline    Hyperlipidemia     Hypertension     Menopausal state 1948    ovarian cyst    Osteoporosis 2009    Sinus disorder        SURGICAL HISTORY       Past Surgical History:   Procedure Laterality Date    COLONOSCOPY  11/30/2016    -hemorrhoids    CYST REMOVAL      scalp, sebaceous, head/neck    RI OPEN RX FEMUR FX+INTRAMED SHREYAS Left 5/13/2018    FEMUR IM NAIL SHREYAS INSERTION performed by Maribel Aponte MD at [facility]       Previous Medications    ASCORBIC ACID (VITAMIN C) 250 MG TABLET    Take 250 mg by mouth daily    CALCIUM-VITAMIN D (OSCAL-500) 500-200 MG-UNIT PER TABLET    Take 1 tablet by mouth 2 times daily    HYDROCORTISONE (ANUSOL-HC) 25 MG SUPPOSITORY    Place 1 suppository rectally every 12 hours    LORATADINE (CLARITIN) 10 MG CAPS    Take  by mouth daily.     MAGNESIUM PO    Take by mouth daily    VITAMIN D (CHOLECALCIFEROL) 1000 UNIT TABS TABLET    Take 1,000 Units by mouth daily       ALLERGIES     Milk-related compounds    FAMILY HISTORY       Family History   Problem Relation Age of Onset    Stroke Mother     Cancer Father         bladder        SOCIAL HISTORY       Social History     Social History    Marital status: Single     Spouse name: N/A    Number of children: N/A    Years of education: N/A     Social History Main Topics    Smoking status: Never Smoker    Smokeless tobacco: Never Used    Alcohol use No    Drug use: No    Sexual activity: Not Currently     Other Topics Concern    Not on file     Social History Narrative    No narrative on file       REVIEW OF SYSTEMS       Review of Systems   Constitutional: Negative for chills and fever. Respiratory: Negative for cough and shortness of breath. Cardiovascular: Negative for chest pain. Gastrointestinal: Positive for abdominal pain, nausea and vomiting. Negative for diarrhea. Genitourinary: Negative for difficulty urinating and hematuria. Skin: Negative for pallor. Neurological: Negative for syncope and light-headedness. All other systems reviewed and are negative. Review of systems as in HPI & PMH otherwise negative    PHYSICAL EXAM    (up to 7 for level 4, 8 or more for level 5)     ED Triage Vitals   BP Temp Temp Source Pulse Resp SpO2 Height Weight   09/01/18 1043 09/01/18 1043 09/01/18 1043 09/01/18 1043 09/01/18 1043 09/01/18 1043 -- 09/01/18 1056   (!) 187/91 98.7 °F (37.1 °C) Tympanic 65 14 97 %  130 lb (59 kg)       Physical Exam   Constitutional: She is oriented to person, place, and time. She appears well-developed and well-nourished. HENT:   Head: Normocephalic and atraumatic. Eyes: Conjunctivae are normal.   Neck: Normal range of motion. Neck supple. Cardiovascular: Normal rate and regular rhythm. Pulmonary/Chest: Effort normal and breath sounds normal.   Abdominal: Soft.  Bowel sounds are normal. There is tenderness. There is guarding. Right lower quadrant   Musculoskeletal: Normal range of motion. Neurological: She is alert and oriented to person, place, and time. No cranial nerve deficit. Coordination normal.   Skin: Skin is warm and dry. No pallor. Psychiatric: She has a normal mood and affect. Her behavior is normal. Judgment and thought content normal.   Nursing note and vitals reviewed. DIAGNOSTIC RESULTS     EKG: (none if blank)  All EKG's are interpreted by the Emergency Department Physician who either signs or Co-signs this chart in the absence of a cardiologist.    NSR    RADIOLOGY: (none if blank)   Interpretation per the Radiologist below, if available at the time of this note:    CT ABDOMEN PELVIS WO CONTRAST Additional Contrast? Radiologist Recommendation   Final Result   *CT evidence of early acute appendicitis. No evidence of perforation or   abscess. *Thickening of the distal esophagus may represent underlying esophagitis. XR CHEST STANDARD (2 VW)    (Results Pending)       LABS:  Labs Reviewed   CBC WITH AUTO DIFFERENTIAL - Abnormal; Notable for the following:        Result Value    WBC 11.6 (*)     Seg Neutrophils 86 (*)     Lymphocytes 9 (*)     Eosinophils % 0 (*)     Segs Absolute 9.86 (*)     Absolute Lymph # 1.08 (*)     All other components within normal limits   COMPREHENSIVE METABOLIC PANEL - Abnormal; Notable for the following:     Glucose 102 (*)     Bun/Cre Ratio 30 (*)     Alkaline Phosphatase 110 (*)     All other components within normal limits   LIPASE   URINALYSIS       All other labs were within normal range or not returned as of this dictation. EMERGENCY DEPARTMENT COURSE and Medical Decision Making:     MDM/     Nurse's notes reviewed as were vital signs.   Labs were obtained results reviewed EKG the abdomen and pelvis showed acute appendicitis as read by radiologist.  This time his EKG and a chest x-ray be obtained case was discussed with [facility] surgeon who will take the patient to the OR. Patient was given IV Zosyn. Strict return precautions and follow up instructions were discussed with the patient with which the patient agrees    ED Medications administered this visit:    Medications   0.9 % sodium chloride infusion (not administered)   piperacillin-tazobactam (ZOSYN) 3.375 g in dextrose 5 % 50 mL IVPB (mini-bag) (not administered)       CONSULTS: (None if blank)  None    Procedures: (None if blank)       CLINICAL IMPRESSION      1. Acute appendicitis, unspecified acute appendicitis type          DISPOSITION/PLAN   DISPOSITION Decision To Admit 09/01/2018 12:44:40 PM      PATIENT REFERRED TO:  No follow-up provider specified.     DISCHARGE MEDICATIONS:  New Prescriptions    No medications on file              (Please note that portions of this note were completed with a voice recognition program.  Efforts were made to edit the dictations but occasionally words are mis-transcribed.)      Electronically signed by Joseph Hilliard PA-C on 9/1/18 at 11:05 AM             Joseph Hilliard PA-C  09/01/18 2029

## 2018-09-01 NOTE — PROGRESS NOTES
Patient to phase 1 pacu after lap appendectomy. Skin pink, warm, and dry. Respirations even and nonlabored. Criteria met for transfer to floor. Taken to  301 by bed.

## 2018-09-01 NOTE — ED NOTES
Pt ambulated to the bathroom and instructed on how to obtain a clean catch urine. Pt will take specimen back to room for collection and labeling. Pt will pull call light if assistance is needed.       Maribel Reza RN  09/01/18 1125

## 2018-09-01 NOTE — ANESTHESIA PROCEDURE NOTES
Peripheral Block    Patient location during procedure: OR  Start time: 9/1/2018 2:28 PM  End time: 9/1/2018 2:33 PM  Staffing  Resident/CRNA: Bob WORKMAN  Performed: resident/CRNA   Preanesthetic Checklist  Completed: patient identified, surgical consent, pre-op evaluation, timeout performed, IV checked, risks and benefits discussed, monitors and equipment checked, anesthesia consent given, oxygen available and patient being monitored  Peripheral Block  Patient position: supine  Prep: ChloraPrep  Patient monitoring: cardiac monitor, continuous pulse ox, continuous capnometry, frequent blood pressure checks and IV access  Block type: TAP  Laterality: bilateral  Injection technique: single-shot  Procedures: ultrasound guided  Local infiltration: ropivacaine and decadron  Infiltration strength: 0.5 %  Dose: 30 mL  Provider prep: mask and sterile gloves  Local infiltration: ropivacaine and decadron  Needle  Needle type: pajunk 80mm TAP.    Assessment  Injection assessment: negative aspiration for heme, no paresthesia on injection and local visualized surrounding nerve on ultrasound  Paresthesia pain: none  Slow fractionated injection: yes  Hemodynamics: stable  Reason for block: post-op pain management and at surgeon's request

## 2018-09-01 NOTE — H&P
Mehnaz Pandya is an 80 y.o.  female. Allergies: Allergies   Allergen Reactions    Milk-Related Compounds        Principal Problem:    Acute appendicitis  Resolved Problems:    * No resolved hospital problems. *    Blood pressure (!) 187/91, pulse 65, temperature 98.7 °F (37.1 °C), temperature source Tympanic, resp. rate 14, weight 130 lb (59 kg), SpO2 97 %. HPI    Ms Nemesio Contreras is a pleasant 79 yo WF who presents with abdominal pain beginning last evening. Initially generalized, now localized to her RLQ. Worse with food, relieved with rest.  Nausea with dry heaves. No diarrhea. No recent sick contact nor travel. No dysuria. WBC 12.  CT abd/pelvis shows early appendicitis. Colonoscopy 2016. No prior abdominal surgery. She has never smoked. At this time, she is resting comfortably. Pain is still present, but improved. No new complaints. Review of Systems   Constitutional: Negative for chills, diaphoresis and fever. HENT: Negative for nosebleeds and sore throat. Eyes: Negative for redness. Respiratory: Negative for cough. Cardiovascular: Negative for chest pain, palpitations and leg swelling. Gastrointestinal: Positive for abdominal pain, nausea and vomiting. Negative for blood in stool. Genitourinary: Negative for dysuria, flank pain and hematuria. Musculoskeletal: Positive for joint pain. Negative for back pain. Neurological: Negative for dizziness, focal weakness, seizures and weakness. Endo/Heme/Allergies: Does not bruise/bleed easily.         Past Medical History:   Diagnosis Date    Asthma     Eczema     History of femur fracture 11/2014    left, hairline    Hyperlipidemia     Hypertension     Menopausal state 1948    ovarian cyst    Osteoporosis 2009    Sinus disorder        Past Surgical History:   Procedure Laterality Date    COLONOSCOPY  11/30/2016    -hemorrhoids    CYST REMOVAL      scalp, sebaceous, head/neck    SD OPEN RX FEMUR GFR Comment         Comment: Average GFR for 79or more years old:    65 mL/min/1.73sq m   Chronic Kidney Disease:    <60 mL/min/1.73sq m   Kidney failure:    <15 mL/min/1.73sq m               eGFR calculated using average adult body mass. Additional eGFR calculator    available at:         [email or web address]              GFR Staging         Comment: Stage 1: Some kidney damage normal GFR   Stage 2: Mild kidney damage GFR 60-89   Stage 3: Moderate kidney damage GFR 30-59   Stage 4: Severe kidney damage GFR 15-29   Stage 5: Severe kidney damage GFR <15   ESRD - chronic treatment by dialysis or transplant             Lipase [[id number]] Collected: 09/01/18 1117   Updated: 09/01/18 1136    Specimen Source: Blood     Lipase 48 U/L   CBC Auto Differential [[id number]] (Abnormal) Collected: 09/01/18 1117   Updated: 09/01/18 1122     WBC 11.6 (H) k/uL    RBC 4.10 m/uL    Hemoglobin 12.6 g/dL    Hematocrit 38.5 %    MCV 93.9 fL    MCH 30.7 pg    MCHC 32.7 g/dL    RDW 12.8 %    Platelets 334 k/uL    MPV 9.7 fL    NRBC Automated 0.0 per 100 WBC    Differential Type NOT REPORTED    Seg Neutrophils 86 (H) %    Lymphocytes 9 (L) %    Monocytes 4 %    Eosinophils % 0 (L) %    Basophils 1 %    Immature Granulocytes 0 %    Segs Absolute 9.86 (H) k/uL    Absolute Lymph # 1.08 (L) k/uL    Absolute Mono # 0.50 k/uL    Absolute Eos # 0.05 k/uL    Basophils # 0.06 k/uL    Absolute Immature Granulocyte 0.05 k/uL             Assessment:    79 yo WF with 1 day of abdominal pain, N/V, CT evidence early appendicitis. Plan: Will proceed with appendectomy at this time. Risks, benefits, alternatives thoroughly reviewed and accepted by Ms Georgia Rubi, including remote risk of bleeding, infection, internal organ injury, etc.  OR staff mobilized.     Lesly Trimble MD  9/1/2018

## 2018-09-02 VITALS
WEIGHT: 136.5 LBS | HEIGHT: 64 IN | SYSTOLIC BLOOD PRESSURE: 136 MMHG | RESPIRATION RATE: 16 BRPM | OXYGEN SATURATION: 95 % | HEART RATE: 80 BPM | DIASTOLIC BLOOD PRESSURE: 60 MMHG | BODY MASS INDEX: 23.31 KG/M2 | TEMPERATURE: 98.6 F

## 2018-09-02 PROCEDURE — 2580000003 HC RX 258: Performed by: SURGERY

## 2018-09-02 PROCEDURE — 6370000000 HC RX 637 (ALT 250 FOR IP): Performed by: SURGERY

## 2018-09-02 PROCEDURE — 93005 ELECTROCARDIOGRAM TRACING: CPT

## 2018-09-02 RX ADMIN — HYDROCODONE BITARTRATE AND ACETAMINOPHEN 1 TABLET: 5; 325 TABLET ORAL at 05:46

## 2018-09-02 RX ADMIN — SODIUM CHLORIDE: 9 INJECTION, SOLUTION INTRAVENOUS at 04:35

## 2018-09-02 ASSESSMENT — PAIN DESCRIPTION - ORIENTATION
ORIENTATION: UPPER
ORIENTATION: UPPER

## 2018-09-02 ASSESSMENT — PAIN SCALES - GENERAL
PAINLEVEL_OUTOF10: 0
PAINLEVEL_OUTOF10: 3
PAINLEVEL_OUTOF10: 4

## 2018-09-02 ASSESSMENT — PAIN DESCRIPTION - PAIN TYPE
TYPE: SURGICAL PAIN
TYPE: SURGICAL PAIN

## 2018-09-02 ASSESSMENT — PAIN DESCRIPTION - LOCATION
LOCATION: ABDOMEN
LOCATION: ABDOMEN

## 2018-09-02 NOTE — OP NOTE
anterior traction on the stay sutures. A  Veress needle was passed perpendicularly into the abdomen. Two clicks were  appreciated. Saline test showed rapid flow into the abdomen. A  pneumoperitoneum was then established to 15 mmHg. The Veress needle was  removed and a 12-mm Visiport was placed under laparoscopic vision directly  into the abdomen. The rectus sheath and peritoneum were clearly visualized  during the course of their dissection. Upon entry into the abdomen, all  visible bowel, colon, and omentum were normal in appearance with no  evidence of needle nor trocar injury. A 5-mm suprapubic port and a 12-mm  epigastric port were placed under direct vision. Attention was directed to  the right lower quadrant. The taeniae of the colon were followed to their  confluence at the cecum, where the base of the appendix was identified. It  was found to be in a retrocecal position. The peritoneal attachments to  the right paracolic gutter were taken down with gentle blunt dissection and  highly selective use of Bovie cautery. This allowed the colon to be  reflected into the midline, where the appendix was then further mobilized  off the right lateral sidewall. With the appendix mobile, a window as  created through the mesoappendix, through which an Endo RHIANNON was passed and  fired across the mesoappendix dividing the vasculature. It was fired  additional times across the appendiceal base at its confluence with the  cecum. Once freed, the appendix was placed in an EndoCatch bag and removed  through the umbilical port site and passed off as specimen. Final  inspection of the staple line showed excellent hemostasis. No evidence of  bowel leak. The right lower quadrant was suction irrigated until clear  with copious amounts of sterile saline. Each port site was topically  anesthetized with 0.25% Marcaine and removed under direct vision again  ensuring hemostasis.   The umbilical and epigastric port sites were closed  by reapproximation of the rectus fascia with 0 Vicryl in a figure-of-eight  fashion. Skin edges were reapproximated with skin staples and covered with  bacitracin and dry sterile dressings. The Fonseca catheter was removed. All  sponge, needle, and instrument counts were correct at the end of the case. The patient tolerated the procedure well and was transferred to PACU in  stable condition. SPECIMENS:  Appendix. DRAINS:  None. COMPLICATIONS:  None. DISPOSITION:  To PACU, extubated, awake, alert, and stable. Following  recovery, we will provide the patient with extended recovery on the floor,  as an outpatient in a bed with gradual advancement of diet and activity. Followup will be with me in one to two weeks for staple removal and with  Dr. Marleny Cruz at her next regular appointment. My thanks to Dr. Marleny Cruz for the consultation. Lester De La Cruz    D: 09/01/2018 15:44:34       T: 09/01/2018 23:17:53     [email or web address]  Job#: [id number]     Doc#: [id number]    CC:   Vinayak Mitchell

## 2018-09-02 NOTE — PROGRESS NOTES
Pt tolerating clear liquids at 1800. Pt has since eaten applesauce, mckenzie crackers, and a turkey sandwich at [address]. Tolerating well without complaints. Vital signs monitored, within normal range. Pt has ambulated in room from bed to bedside commode and back, no assistive devices, tolerated well. Pt has had pain ratings from 1-3 on a 0-10 pain scale. Pain medications offered, pt declined at those times. Pt states \"It is okay right now. If it gets much worse, then I will take something. \" Pt stated she has some current epigastric pain noted radiating upward. No chest pain or heartburn similarity per patient. Writer informed patient that she is to be ambulating every shift per Dr. Jann Carroll. Pt agrees to walk later this evening with writer. Pt has no other needs at this time. Will continue to monitor patient and ambulate this shift.

## 2018-09-02 NOTE — PROGRESS NOTES
Pt ambulated in hallway, 150 feet, using cane. Tolerated well. Pt did have a slight increase in pain afterwards. Medication provided as intervention.

## 2018-09-02 NOTE — PLAN OF CARE
Problem: Pain:  Goal: Control of acute pain  Control of acute pain   Outcome: Ongoing  Pain assessment provided. Pt has denied any medications thus far. Will continue to monitor and provide interventions as appropriate. Problem: Falls - Risk of:  Goal: Will remain free from falls  Will remain free from falls   Outcome: Ongoing  Pt bed in lowest position with wheels locked. Call light within reach. Room remains free of obstacles. Pt reminded to use call light as needed, verbalizes understanding. Will continue to monitor. Problem: Activity:  Goal: Ability to tolerate increased activity will improve  Ability to tolerate increased activity will improve   Outcome: Ongoing  Pt has ambulated a short distance in room. Will ambulate patient further this evening. Problem: Bowel/Gastric:  Goal: Gastrointestinal status for postoperative course will improve  Gastrointestinal status for postoperative course will improve   Outcome: Ongoing  Pt has tolerating oral intake of food, no complaints. Bowel sounds hypoactive. Problem: Nutritional:  Goal: Maintenance of adequate nutrition will improve  Maintenance of adequate nutrition will improve   Outcome: Ongoing  Pt has eaten and tolerated foods since surgery. Problem: Physical Regulation:  Goal: Postoperative complications will be avoided or minimized  Postoperative complications will be avoided or minimized   Outcome: Ongoing  VS monitored, assessment performed. HOB elevated. IVF infusing. Problem: Skin Integrity:  Goal: Skin integrity will stabilize  Skin integrity will stabilize   Outcome: Ongoing  Pt turns and repositions self. Abdomen has three surgical incision, dressed.

## 2018-09-05 LAB — SURGICAL PATHOLOGY REPORT: NORMAL

## 2018-09-10 ENCOUNTER — OFFICE VISIT (OUTPATIENT)
Dept: FAMILY MEDICINE CLINIC | Age: 83
End: 2018-09-10
Payer: MEDICARE

## 2018-09-10 VITALS — BODY MASS INDEX: 23.57 KG/M2 | HEIGHT: 63 IN | WEIGHT: 133 LBS

## 2018-09-10 DIAGNOSIS — G31.84 MCI (MILD COGNITIVE IMPAIRMENT): ICD-10-CM

## 2018-09-10 DIAGNOSIS — Z90.49 STATUS POST APPENDECTOMY: ICD-10-CM

## 2018-09-10 DIAGNOSIS — M40.204 KYPHOSIS OF THORACIC REGION, UNSPECIFIED KYPHOSIS TYPE: ICD-10-CM

## 2018-09-10 DIAGNOSIS — M81.0 OSTEOPOROSIS, UNSPECIFIED OSTEOPOROSIS TYPE, UNSPECIFIED PATHOLOGICAL FRACTURE PRESENCE: Primary | ICD-10-CM

## 2018-09-10 PROCEDURE — 99214 OFFICE O/P EST MOD 30 MIN: CPT | Performed by: FAMILY MEDICINE

## 2018-09-10 PROCEDURE — 1036F TOBACCO NON-USER: CPT | Performed by: FAMILY MEDICINE

## 2018-09-10 PROCEDURE — 1101F PT FALLS ASSESS-DOCD LE1/YR: CPT | Performed by: FAMILY MEDICINE

## 2018-09-10 PROCEDURE — 1090F PRES/ABSN URINE INCON ASSESS: CPT | Performed by: FAMILY MEDICINE

## 2018-09-10 PROCEDURE — G8420 CALC BMI NORM PARAMETERS: HCPCS | Performed by: FAMILY MEDICINE

## 2018-09-10 PROCEDURE — 1123F ACP DISCUSS/DSCN MKR DOCD: CPT | Performed by: FAMILY MEDICINE

## 2018-09-10 PROCEDURE — 4040F PNEUMOC VAC/ADMIN/RCVD: CPT | Performed by: FAMILY MEDICINE

## 2018-09-10 PROCEDURE — G8399 PT W/DXA RESULTS DOCUMENT: HCPCS | Performed by: FAMILY MEDICINE

## 2018-09-10 PROCEDURE — G8427 DOCREV CUR MEDS BY ELIG CLIN: HCPCS | Performed by: FAMILY MEDICINE

## 2018-09-10 NOTE — PROGRESS NOTES
IDavid CMA, am scribing for and in the presence of Dr. Thomas Antonio. 9/10/18/3:14 pm [place] [address] [address]  [place] Pa[place] [address] [place] [address]  [place] [phone]  Dept: [phone]    HPI:  9/1/18 pt developed sudden onset of pain and vomiting and went to the ER  She was diagnosed with appendicitis and had her appendix removed that day  She is here to have her staples removed     Pt is scheduled to come to the office later this week for her regular check, we will just do this today as well    Current Outpatient Prescriptions   Medication Sig Dispense Refill    Ascorbic Acid (VITAMIN C) 250 MG tablet Take 250 mg by mouth daily      vitamin D (CHOLECALCIFEROL) 1000 UNIT TABS tablet Take 1,000 Units by mouth daily      MAGNESIUM PO Take by mouth daily      calcium-vitamin D (OSCAL-500) 500-200 MG-UNIT per tablet Take 1 tablet by mouth 2 times daily 30 tablet 3    hydrocortisone (ANUSOL-HC) 25 MG suppository Place 1 suppository rectally every 12 hours 6 suppository 0    Loratadine (CLARITIN) 10 MG CAPS Take  by mouth daily. No current facility-administered medications for this visit. ROS:  Denies pain  Denies falls    EXAM:  Ht 5' 3\" (1.6 m)   Wt 133 lb (60.3 kg)   BMI 23.56 kg/m²   Wt Readings from Last 3 Encounters:   09/10/18 133 lb (60.3 kg)   09/02/18 136 lb 8 oz (61.9 kg)   06/15/18 136 lb (61.7 kg)     BP Readings from Last 3 Encounters:   09/02/18 136/60   09/01/18 (!) 171/97   06/15/18 (!) 146/72     General Appearance: in no acute distress, well developed, well nourished. Eyes: pupils equal, round reactive to light and accommodation. Ears: canals occluded about 80% bilaterally  Nose: nares patent, no lesions. Oral Cavity: mucosa moist.  Throat: clear. Neck/Thyroid: neck supple, full range of motion, no cervical lymphadenopathy, no thyromegaly or carotid bruits. Skin: warm and dry. No suspicious lesions. Heart: regular rate and rhythm. No murmurs.  S1,

## 2018-09-10 NOTE — PATIENT INSTRUCTIONS
PLAN:  I reviewed her ER visit and surgical note  We discussed her bone density and her past fractures. She continues to take her Vitamin D and Calcium and is trying to increase her Protein intake. I would like further intervention for bone strengthening but she is not interested. We discussed posture and exercises to help with her kyphosis  Her surgical site looks good, she can have her staples removed today    SURVEY:    You may be receiving a survey from [employer] regarding your visit today. Please complete the survey to enable us to provide the highest quality of care to you and your family. If you cannot score us a very good on any question, please call the office to discuss how we could of made your experience a very good one. Thank you.

## 2019-01-14 ENCOUNTER — TELEPHONE (OUTPATIENT)
Dept: FAMILY MEDICINE CLINIC | Age: 84
End: 2019-01-14

## 2019-01-14 DIAGNOSIS — N95.1 MENOPAUSAL STATE: Primary | Chronic | ICD-10-CM

## 2019-02-18 ENCOUNTER — OFFICE VISIT (OUTPATIENT)
Dept: OBGYN | Age: 84
End: 2019-02-18
Payer: MEDICARE

## 2019-02-18 VITALS
DIASTOLIC BLOOD PRESSURE: 78 MMHG | SYSTOLIC BLOOD PRESSURE: 136 MMHG | WEIGHT: 141 LBS | HEIGHT: 64 IN | BODY MASS INDEX: 24.07 KG/M2

## 2019-02-18 DIAGNOSIS — R10.11 RIGHT UPPER QUADRANT ABDOMINAL PAIN: Primary | ICD-10-CM

## 2019-02-18 PROCEDURE — 99202 OFFICE O/P NEW SF 15 MIN: CPT | Performed by: OBSTETRICS & GYNECOLOGY

## 2019-02-18 PROCEDURE — 1090F PRES/ABSN URINE INCON ASSESS: CPT | Performed by: OBSTETRICS & GYNECOLOGY

## 2019-02-18 PROCEDURE — G8420 CALC BMI NORM PARAMETERS: HCPCS | Performed by: OBSTETRICS & GYNECOLOGY

## 2019-02-18 PROCEDURE — G8484 FLU IMMUNIZE NO ADMIN: HCPCS | Performed by: OBSTETRICS & GYNECOLOGY

## 2019-02-18 PROCEDURE — 4040F PNEUMOC VAC/ADMIN/RCVD: CPT | Performed by: OBSTETRICS & GYNECOLOGY

## 2019-02-18 PROCEDURE — 1036F TOBACCO NON-USER: CPT | Performed by: OBSTETRICS & GYNECOLOGY

## 2019-02-18 PROCEDURE — G8427 DOCREV CUR MEDS BY ELIG CLIN: HCPCS | Performed by: OBSTETRICS & GYNECOLOGY

## 2019-02-18 PROCEDURE — 1101F PT FALLS ASSESS-DOCD LE1/YR: CPT | Performed by: OBSTETRICS & GYNECOLOGY

## 2019-02-18 PROCEDURE — 1123F ACP DISCUSS/DSCN MKR DOCD: CPT | Performed by: OBSTETRICS & GYNECOLOGY

## 2019-02-18 ASSESSMENT — PATIENT HEALTH QUESTIONNAIRE - PHQ9
2. FEELING DOWN, DEPRESSED OR HOPELESS: 0
SUM OF ALL RESPONSES TO PHQ QUESTIONS 1-9: 0
SUM OF ALL RESPONSES TO PHQ QUESTIONS 1-9: 0
SUM OF ALL RESPONSES TO PHQ9 QUESTIONS 1 & 2: 0
1. LITTLE INTEREST OR PLEASURE IN DOING THINGS: 0

## 2019-03-11 ENCOUNTER — HOSPITAL ENCOUNTER (OUTPATIENT)
Dept: LAB | Age: 84
Discharge: HOME | End: 2019-03-11
Payer: MEDICARE

## 2019-03-11 DIAGNOSIS — D50.9 IRON DEFICIENCY ANEMIA, UNSPECIFIED IRON DEFICIENCY ANEMIA TYPE: ICD-10-CM

## 2019-03-11 LAB
ALT SERPL-CCNC: 16 U/L (ref 5–33)
ANION GAP SERPL CALCULATED.3IONS-SCNC: 11 MMOL/L (ref 9–17)
AST SERPL-CCNC: 20 U/L
BUN BLDV-MCNC: 21 MG/DL (ref 8–23)
BUN/CREAT BLD: 31 (ref 9–20)
CALCIUM SERPL-MCNC: 10.3 MG/DL (ref 8.6–10.4)
CHLORIDE BLD-SCNC: 105 MMOL/L (ref 98–107)
CO2: 27 MMOL/L (ref 20–31)
CREAT SERPL-MCNC: 0.68 MG/DL (ref 0.5–0.9)
GFR AFRICAN AMERICAN: >60 ML/MIN
GFR NON-AFRICAN AMERICAN: >60 ML/MIN
GFR SERPL CREATININE-BSD FRML MDRD: ABNORMAL ML/MIN/{1.73_M2}
GFR SERPL CREATININE-BSD FRML MDRD: ABNORMAL ML/MIN/{1.73_M2}
GLUCOSE BLD-MCNC: 85 MG/DL (ref 70–99)
HCT VFR BLD CALC: 42.1 % (ref 36.3–47.1)
HEMOGLOBIN: 13.2 G/DL (ref 11.9–15.1)
MCH RBC QN AUTO: 30.1 PG (ref 25.2–33.5)
MCHC RBC AUTO-ENTMCNC: 31.4 G/DL (ref 28.4–34.8)
MCV RBC AUTO: 95.9 FL (ref 82.6–102.9)
NRBC AUTOMATED: 0 PER 100 WBC
PDW BLD-RTO: 12.9 % (ref 11.8–14.4)
PLATELET # BLD: 218 K/UL (ref 138–453)
PMV BLD AUTO: 9.6 FL (ref 8.1–13.5)
POTASSIUM SERPL-SCNC: 4.9 MMOL/L (ref 3.7–5.3)
RBC # BLD: 4.39 M/UL (ref 3.95–5.11)
SODIUM BLD-SCNC: 143 MMOL/L (ref 135–144)
WBC # BLD: 8.1 K/UL (ref 3.5–11.3)

## 2019-03-11 PROCEDURE — 84450 TRANSFERASE (AST) (SGOT): CPT

## 2019-03-11 PROCEDURE — 36415 COLL VENOUS BLD VENIPUNCTURE: CPT

## 2019-03-11 PROCEDURE — 85027 COMPLETE CBC AUTOMATED: CPT

## 2019-03-11 PROCEDURE — 84460 ALANINE AMINO (ALT) (SGPT): CPT

## 2019-03-11 PROCEDURE — 80048 BASIC METABOLIC PNL TOTAL CA: CPT

## 2019-03-12 ENCOUNTER — OFFICE VISIT (OUTPATIENT)
Dept: FAMILY MEDICINE CLINIC | Age: 84
End: 2019-03-12
Payer: MEDICARE

## 2019-03-12 VITALS
WEIGHT: 138 LBS | BODY MASS INDEX: 23.56 KG/M2 | SYSTOLIC BLOOD PRESSURE: 122 MMHG | HEIGHT: 64 IN | DIASTOLIC BLOOD PRESSURE: 80 MMHG

## 2019-03-12 DIAGNOSIS — M40.204 KYPHOSIS OF THORACIC REGION, UNSPECIFIED KYPHOSIS TYPE: ICD-10-CM

## 2019-03-12 DIAGNOSIS — E78.5 HYPERLIPIDEMIA, UNSPECIFIED HYPERLIPIDEMIA TYPE: ICD-10-CM

## 2019-03-12 DIAGNOSIS — H61.23 BILATERAL IMPACTED CERUMEN: Primary | ICD-10-CM

## 2019-03-12 DIAGNOSIS — N32.81 DETRUSOR INSTABILITY OF BLADDER: ICD-10-CM

## 2019-03-12 DIAGNOSIS — M81.0 OSTEOPOROSIS, UNSPECIFIED OSTEOPOROSIS TYPE, UNSPECIFIED PATHOLOGICAL FRACTURE PRESENCE: ICD-10-CM

## 2019-03-12 PROCEDURE — 1090F PRES/ABSN URINE INCON ASSESS: CPT | Performed by: FAMILY MEDICINE

## 2019-03-12 PROCEDURE — 99214 OFFICE O/P EST MOD 30 MIN: CPT | Performed by: FAMILY MEDICINE

## 2019-03-12 PROCEDURE — 1036F TOBACCO NON-USER: CPT | Performed by: FAMILY MEDICINE

## 2019-03-12 PROCEDURE — 1123F ACP DISCUSS/DSCN MKR DOCD: CPT | Performed by: FAMILY MEDICINE

## 2019-03-12 PROCEDURE — 1101F PT FALLS ASSESS-DOCD LE1/YR: CPT | Performed by: FAMILY MEDICINE

## 2019-03-12 PROCEDURE — G8484 FLU IMMUNIZE NO ADMIN: HCPCS | Performed by: FAMILY MEDICINE

## 2019-03-12 PROCEDURE — G8427 DOCREV CUR MEDS BY ELIG CLIN: HCPCS | Performed by: FAMILY MEDICINE

## 2019-03-12 PROCEDURE — 4040F PNEUMOC VAC/ADMIN/RCVD: CPT | Performed by: FAMILY MEDICINE

## 2019-03-12 PROCEDURE — G8420 CALC BMI NORM PARAMETERS: HCPCS | Performed by: FAMILY MEDICINE

## 2019-03-13 ENCOUNTER — APPOINTMENT (OUTPATIENT)
Dept: GENERAL RADIOLOGY | Age: 84
End: 2019-03-13
Payer: MEDICARE

## 2019-03-13 ENCOUNTER — HOSPITAL ENCOUNTER (EMERGENCY)
Age: 84
Discharge: HOME | End: 2019-03-13
Payer: MEDICARE

## 2019-03-13 VITALS
DIASTOLIC BLOOD PRESSURE: 87 MMHG | SYSTOLIC BLOOD PRESSURE: 164 MMHG | OXYGEN SATURATION: 97 % | HEART RATE: 67 BPM | RESPIRATION RATE: 16 BRPM | TEMPERATURE: 98.7 F

## 2019-03-13 DIAGNOSIS — S52.501A CLOSED FRACTURE OF DISTAL END OF RIGHT RADIUS, UNSPECIFIED FRACTURE MORPHOLOGY, INITIAL ENCOUNTER: Primary | ICD-10-CM

## 2019-03-13 PROCEDURE — 73130 X-RAY EXAM OF HAND: CPT

## 2019-03-13 PROCEDURE — 29125 APPL SHORT ARM SPLINT STATIC: CPT

## 2019-03-13 PROCEDURE — 99283 EMERGENCY DEPT VISIT LOW MDM: CPT

## 2019-03-13 PROCEDURE — 73110 X-RAY EXAM OF WRIST: CPT

## 2019-03-13 ASSESSMENT — PAIN DESCRIPTION - LOCATION: LOCATION: WRIST

## 2019-03-13 ASSESSMENT — ENCOUNTER SYMPTOMS
CONSTIPATION: 0
EYE DISCHARGE: 0
EYE REDNESS: 0
BLOOD IN STOOL: 0
WHEEZING: 0
RHINORRHEA: 0
CHEST TIGHTNESS: 0
COUGH: 0
NAUSEA: 0
DIARRHEA: 0
BACK PAIN: 0
SHORTNESS OF BREATH: 0
VOMITING: 0
ABDOMINAL PAIN: 0
SORE THROAT: 0

## 2019-03-13 ASSESSMENT — PAIN DESCRIPTION - PAIN TYPE: TYPE: ACUTE PAIN

## 2019-03-13 ASSESSMENT — PAIN SCALES - GENERAL: PAINLEVEL_OUTOF10: 4

## 2019-03-13 ASSESSMENT — PAIN DESCRIPTION - ORIENTATION: ORIENTATION: RIGHT

## 2019-03-14 ENCOUNTER — CARE COORDINATION (OUTPATIENT)
Dept: CARE COORDINATION | Age: 84
End: 2019-03-14

## 2019-04-26 ENCOUNTER — TELEPHONE (OUTPATIENT)
Dept: FAMILY MEDICINE CLINIC | Age: 84
End: 2019-04-26
Payer: MEDICARE

## 2019-04-26 DIAGNOSIS — Z12.11 SCREENING FOR COLON CANCER: Primary | ICD-10-CM

## 2019-04-26 PROCEDURE — 82274 ASSAY TEST FOR BLOOD FECAL: CPT | Performed by: FAMILY MEDICINE

## 2019-06-14 PROBLEM — H61.23 BILATERAL IMPACTED CERUMEN: Status: RESOLVED | Noted: 2019-03-12 | Resolved: 2019-06-14

## 2019-06-14 PROBLEM — S72.142A CLOSED DISPLACED INTERTROCHANTERIC FRACTURE OF LEFT FEMUR (HCC): Status: RESOLVED | Noted: 2018-05-18 | Resolved: 2019-06-14

## 2019-06-25 ENCOUNTER — OFFICE VISIT (OUTPATIENT)
Dept: FAMILY MEDICINE CLINIC | Age: 84
End: 2019-06-25
Payer: MEDICARE

## 2019-06-25 VITALS
BODY MASS INDEX: 23.56 KG/M2 | DIASTOLIC BLOOD PRESSURE: 90 MMHG | WEIGHT: 138 LBS | HEIGHT: 64 IN | SYSTOLIC BLOOD PRESSURE: 158 MMHG

## 2019-06-25 DIAGNOSIS — H61.23 BILATERAL IMPACTED CERUMEN: ICD-10-CM

## 2019-06-25 DIAGNOSIS — G31.84 MCI (MILD COGNITIVE IMPAIRMENT): Primary | ICD-10-CM

## 2019-06-25 PROCEDURE — 1123F ACP DISCUSS/DSCN MKR DOCD: CPT | Performed by: FAMILY MEDICINE

## 2019-06-25 PROCEDURE — G8427 DOCREV CUR MEDS BY ELIG CLIN: HCPCS | Performed by: FAMILY MEDICINE

## 2019-06-25 PROCEDURE — 1036F TOBACCO NON-USER: CPT | Performed by: FAMILY MEDICINE

## 2019-06-25 PROCEDURE — 99213 OFFICE O/P EST LOW 20 MIN: CPT | Performed by: FAMILY MEDICINE

## 2019-06-25 PROCEDURE — 1090F PRES/ABSN URINE INCON ASSESS: CPT | Performed by: FAMILY MEDICINE

## 2019-06-25 PROCEDURE — 4040F PNEUMOC VAC/ADMIN/RCVD: CPT | Performed by: FAMILY MEDICINE

## 2019-06-25 PROCEDURE — G8420 CALC BMI NORM PARAMETERS: HCPCS | Performed by: FAMILY MEDICINE

## 2019-06-25 RX ORDER — IBUPROFEN 200 MG
200 TABLET ORAL EVERY 6 HOURS PRN
COMMUNITY
End: 2022-05-20 | Stop reason: CLARIF

## 2019-06-25 NOTE — PATIENT INSTRUCTIONS
PLAN:  Her lungs sound good. I will irrigate her ears today. SURVEY:    You may be receiving a survey from [employer] regarding your visit today. Please complete the survey to enable us to provide the highest quality of care to you and your family. If you cannot score us a very good on any question, please call the office to discuss how we could have made your experience a very good one. Thank you.

## 2019-06-25 NOTE — PROGRESS NOTES
SAKINA, Tyrone Fisher, DOUGLAS, am scribing for and in the presence of Dr. Jenifer Sanchez. 06/25/19 3:41 pm  [facility]  [address]  [place] [address]  Dept: [phone]    HPI:  Pt. Presents with c/o fullness to left ear about 2 weeks ago. She was to get her ears irrigated several months ago but fell and fractured her wrist so she had to cancel the appt and never got it rescheduled. She has been using debrox for the last 3 days. Current Outpatient Medications   Medication Sig Dispense Refill    ibuprofen (ADVIL;MOTRIN) 200 MG tablet Take 200 mg by mouth every 6 hours as needed for Pain      Ascorbic Acid (VITAMIN C) 250 MG tablet Take 250 mg by mouth daily      vitamin D (CHOLECALCIFEROL) 1000 UNIT TABS tablet Take 1,000 Units by mouth daily      MAGNESIUM PO Take by mouth daily      calcium-vitamin D (OSCAL-500) 500-200 MG-UNIT per tablet Take 1 tablet by mouth 2 times daily 30 tablet 3    hydrocortisone (ANUSOL-HC) 25 MG suppository Place 1 suppository rectally every 12 hours 6 suppository 0    Loratadine (CLARITIN) 10 MG CAPS Take  by mouth daily. No current facility-administered medications for this visit. ROS:  Admits fullness in left ear 2 weeks ago, has since subsided since using debrox for the last few days. Denies fever. Denies ear pain. Admits allergy symptoms. EXAM:  BP (!) 158/90   Ht 5' 4\" (1.626 m)   Wt 138 lb (62.6 kg)   BMI 23.69 kg/m²   Wt Readings from Last 3 Encounters:   06/25/19 138 lb (62.6 kg)   03/12/19 138 lb (62.6 kg)   02/18/19 141 lb (64 kg)     BP Readings from Last 3 Encounters:   06/25/19 (!) 158/90   03/13/19 (!) 164/87   03/12/19 122/80     PHYSICAL EXAM:  General Appearance: in no acute distress, well developed, well nourished. Eyes: pupils equal, round reactive to light and accommodation. Ears:  Left canal occluded with fairly firm cerumen, R canal occluded with somewhat softer cerumen.    Oral Cavity: mucosa moist.  Neck/Thyroid: neck supple, full range of motion  Skin: warm and dry. No suspicious lesions. Heart: regular rate and rhythm. No murmurs. S1, S2 normal, no gallops. Lungs: clear to auscultation bilaterally. Musculoskeletal: normal, full range of motion in knees and hips  Psych: normal affect, speech fluent. Ear Cerumen Removal Procedure Note  Indication: ear cerumen impaction    Procedure: After placing the patient's head in the appropriate position, the patient's bilateral ear canals were irrigated with the appropriate solution until all cerumen was removed and the ear canal was clear. The patient tolerated the procedure well. Dr. Ronaldo Nelson examined the patient's ears post irrigation confirming that the canals are clear bilaterally. ASSESSMENT:   Diagnosis Orders   1. MCI (mild cognitive impairment)     2. Bilateral impacted cerumen           PLAN:  Her lungs sound good. I will irrigate her ears today. No orders of the defined types were placed in this encounter. No orders of the defined types were placed in this encounter. I, Dr. Javy Chaidez, personally performed the services described in this documentation as scribed by SACHIN Nava in my presence, and is both accurate and complete.

## 2019-08-27 ENCOUNTER — TELEPHONE (OUTPATIENT)
Dept: FAMILY MEDICINE CLINIC | Age: 84
End: 2019-08-27

## 2019-09-06 ENCOUNTER — HOSPITAL ENCOUNTER (OUTPATIENT)
Age: 84
Discharge: HOME | End: 2019-09-06
Payer: MEDICARE

## 2019-09-06 DIAGNOSIS — E78.5 HYPERLIPIDEMIA, UNSPECIFIED HYPERLIPIDEMIA TYPE: ICD-10-CM

## 2019-09-06 DIAGNOSIS — M81.0 OSTEOPOROSIS, UNSPECIFIED OSTEOPOROSIS TYPE, UNSPECIFIED PATHOLOGICAL FRACTURE PRESENCE: ICD-10-CM

## 2019-09-06 LAB
ALT SERPL-CCNC: 16 U/L (ref 5–33)
ANION GAP SERPL CALCULATED.3IONS-SCNC: 10 MMOL/L (ref 9–17)
AST SERPL-CCNC: 17 U/L
BUN BLDV-MCNC: 15 MG/DL (ref 8–23)
BUN/CREAT BLD: 26 (ref 9–20)
CALCIUM SERPL-MCNC: 10 MG/DL (ref 8.6–10.4)
CHLORIDE BLD-SCNC: 98 MMOL/L (ref 98–107)
CO2: 27 MMOL/L (ref 20–31)
CREAT SERPL-MCNC: 0.58 MG/DL (ref 0.5–0.9)
GFR AFRICAN AMERICAN: >60 ML/MIN
GFR NON-AFRICAN AMERICAN: >60 ML/MIN
GFR SERPL CREATININE-BSD FRML MDRD: ABNORMAL ML/MIN/{1.73_M2}
GFR SERPL CREATININE-BSD FRML MDRD: ABNORMAL ML/MIN/{1.73_M2}
GLUCOSE BLD-MCNC: 83 MG/DL (ref 70–99)
HCT VFR BLD CALC: 37.5 % (ref 36.3–47.1)
HEMOGLOBIN: 12 G/DL (ref 11.9–15.1)
MCH RBC QN AUTO: 30.5 PG (ref 25.2–33.5)
MCHC RBC AUTO-ENTMCNC: 32 G/DL (ref 28.4–34.8)
MCV RBC AUTO: 95.2 FL (ref 82.6–102.9)
NRBC AUTOMATED: 0 PER 100 WBC
PDW BLD-RTO: 13 % (ref 11.8–14.4)
PLATELET # BLD: 208 K/UL (ref 138–453)
PMV BLD AUTO: 9.4 FL (ref 8.1–13.5)
POTASSIUM SERPL-SCNC: 4.3 MMOL/L (ref 3.7–5.3)
RBC # BLD: 3.94 M/UL (ref 3.95–5.11)
SODIUM BLD-SCNC: 135 MMOL/L (ref 135–144)
VITAMIN D 25-HYDROXY: 58.5 NG/ML (ref 30–100)
WBC # BLD: 9.3 K/UL (ref 3.5–11.3)

## 2019-09-06 PROCEDURE — 36415 COLL VENOUS BLD VENIPUNCTURE: CPT

## 2019-09-06 PROCEDURE — 85027 COMPLETE CBC AUTOMATED: CPT

## 2019-09-06 PROCEDURE — 84450 TRANSFERASE (AST) (SGOT): CPT

## 2019-09-06 PROCEDURE — 84460 ALANINE AMINO (ALT) (SGPT): CPT

## 2019-09-06 PROCEDURE — 80048 BASIC METABOLIC PNL TOTAL CA: CPT

## 2019-09-06 PROCEDURE — 82306 VITAMIN D 25 HYDROXY: CPT

## 2019-09-11 ENCOUNTER — OFFICE VISIT (OUTPATIENT)
Dept: FAMILY MEDICINE CLINIC | Age: 84
End: 2019-09-11
Payer: MEDICARE

## 2019-09-11 VITALS
WEIGHT: 139.4 LBS | BODY MASS INDEX: 23.8 KG/M2 | SYSTOLIC BLOOD PRESSURE: 124 MMHG | HEIGHT: 64 IN | DIASTOLIC BLOOD PRESSURE: 74 MMHG

## 2019-09-11 DIAGNOSIS — M81.0 OSTEOPOROSIS, UNSPECIFIED OSTEOPOROSIS TYPE, UNSPECIFIED PATHOLOGICAL FRACTURE PRESENCE: Primary | ICD-10-CM

## 2019-09-11 DIAGNOSIS — K57.90 DIVERTICULOSIS: ICD-10-CM

## 2019-09-11 DIAGNOSIS — I50.32 CHRONIC DIASTOLIC CHF (CONGESTIVE HEART FAILURE) (HCC): ICD-10-CM

## 2019-09-11 DIAGNOSIS — M40.204 KYPHOSIS OF THORACIC REGION, UNSPECIFIED KYPHOSIS TYPE: Chronic | ICD-10-CM

## 2019-09-11 PROCEDURE — G8420 CALC BMI NORM PARAMETERS: HCPCS | Performed by: FAMILY MEDICINE

## 2019-09-11 PROCEDURE — 1036F TOBACCO NON-USER: CPT | Performed by: FAMILY MEDICINE

## 2019-09-11 PROCEDURE — 1123F ACP DISCUSS/DSCN MKR DOCD: CPT | Performed by: FAMILY MEDICINE

## 2019-09-11 PROCEDURE — 99214 OFFICE O/P EST MOD 30 MIN: CPT | Performed by: FAMILY MEDICINE

## 2019-09-11 PROCEDURE — G8427 DOCREV CUR MEDS BY ELIG CLIN: HCPCS | Performed by: FAMILY MEDICINE

## 2019-09-11 PROCEDURE — 4040F PNEUMOC VAC/ADMIN/RCVD: CPT | Performed by: FAMILY MEDICINE

## 2019-09-11 PROCEDURE — 1090F PRES/ABSN URINE INCON ASSESS: CPT | Performed by: FAMILY MEDICINE

## 2019-10-08 ENCOUNTER — OFFICE VISIT (OUTPATIENT)
Dept: FAMILY MEDICINE CLINIC | Age: 84
End: 2019-10-08
Payer: MEDICARE

## 2019-10-08 VITALS
BODY MASS INDEX: 23.9 KG/M2 | HEIGHT: 64 IN | DIASTOLIC BLOOD PRESSURE: 78 MMHG | SYSTOLIC BLOOD PRESSURE: 136 MMHG | WEIGHT: 140 LBS

## 2019-10-08 DIAGNOSIS — M40.204 KYPHOSIS OF THORACIC REGION, UNSPECIFIED KYPHOSIS TYPE: ICD-10-CM

## 2019-10-08 DIAGNOSIS — M62.81 MUSCLE WEAKNESS-GENERAL: Primary | ICD-10-CM

## 2019-10-08 DIAGNOSIS — Z82.0 FAMILY HISTORY OF MYASTHENIA GRAVIS: ICD-10-CM

## 2019-10-08 DIAGNOSIS — N63.10 BREAST MASS, RIGHT: ICD-10-CM

## 2019-10-08 DIAGNOSIS — E78.5 HYPERLIPIDEMIA, UNSPECIFIED HYPERLIPIDEMIA TYPE: ICD-10-CM

## 2019-10-08 PROCEDURE — 4040F PNEUMOC VAC/ADMIN/RCVD: CPT | Performed by: FAMILY MEDICINE

## 2019-10-08 PROCEDURE — G8484 FLU IMMUNIZE NO ADMIN: HCPCS | Performed by: FAMILY MEDICINE

## 2019-10-08 PROCEDURE — 1123F ACP DISCUSS/DSCN MKR DOCD: CPT | Performed by: FAMILY MEDICINE

## 2019-10-08 PROCEDURE — 99213 OFFICE O/P EST LOW 20 MIN: CPT | Performed by: FAMILY MEDICINE

## 2019-10-08 PROCEDURE — G8427 DOCREV CUR MEDS BY ELIG CLIN: HCPCS | Performed by: FAMILY MEDICINE

## 2019-10-08 PROCEDURE — 1090F PRES/ABSN URINE INCON ASSESS: CPT | Performed by: FAMILY MEDICINE

## 2019-10-08 PROCEDURE — 1036F TOBACCO NON-USER: CPT | Performed by: FAMILY MEDICINE

## 2019-10-08 PROCEDURE — G8420 CALC BMI NORM PARAMETERS: HCPCS | Performed by: FAMILY MEDICINE

## 2019-10-09 ENCOUNTER — HOSPITAL ENCOUNTER (OUTPATIENT)
Dept: LAB | Age: 84
Discharge: HOME | End: 2019-10-09
Payer: MEDICARE

## 2019-10-09 DIAGNOSIS — E78.5 HYPERLIPIDEMIA, UNSPECIFIED HYPERLIPIDEMIA TYPE: ICD-10-CM

## 2019-10-09 DIAGNOSIS — M62.81 MUSCLE WEAKNESS-GENERAL: ICD-10-CM

## 2019-10-09 LAB
ANION GAP SERPL CALCULATED.3IONS-SCNC: 13 MMOL/L (ref 9–17)
BUN BLDV-MCNC: 14 MG/DL (ref 8–23)
BUN/CREAT BLD: 25 (ref 9–20)
CALCIUM SERPL-MCNC: 10.6 MG/DL (ref 8.6–10.4)
CHLORIDE BLD-SCNC: 103 MMOL/L (ref 98–107)
CO2: 26 MMOL/L (ref 20–31)
CREAT SERPL-MCNC: 0.56 MG/DL (ref 0.5–0.9)
GFR AFRICAN AMERICAN: >60 ML/MIN
GFR NON-AFRICAN AMERICAN: >60 ML/MIN
GFR SERPL CREATININE-BSD FRML MDRD: ABNORMAL ML/MIN/{1.73_M2}
GFR SERPL CREATININE-BSD FRML MDRD: ABNORMAL ML/MIN/{1.73_M2}
GLUCOSE BLD-MCNC: 96 MG/DL (ref 70–99)
HCT VFR BLD CALC: 42.6 % (ref 36.3–47.1)
HEMOGLOBIN: 13.3 G/DL (ref 11.9–15.1)
HIGH SENSITIVE C-REACTIVE PROTEIN: 1.9 MG/L
MCH RBC QN AUTO: 29.4 PG (ref 25.2–33.5)
MCHC RBC AUTO-ENTMCNC: 31.2 G/DL (ref 28.4–34.8)
MCV RBC AUTO: 94.2 FL (ref 82.6–102.9)
NRBC AUTOMATED: 0 PER 100 WBC
PDW BLD-RTO: 12.8 % (ref 11.8–14.4)
PLATELET # BLD: 182 K/UL (ref 138–453)
PMV BLD AUTO: 10.7 FL (ref 8.1–13.5)
POTASSIUM SERPL-SCNC: 4.3 MMOL/L (ref 3.7–5.3)
RBC # BLD: 4.52 M/UL (ref 3.95–5.11)
SEDIMENTATION RATE, ERYTHROCYTE: 34 MM (ref 0–20)
SODIUM BLD-SCNC: 142 MMOL/L (ref 135–144)
WBC # BLD: 8.2 K/UL (ref 3.5–11.3)

## 2019-10-09 PROCEDURE — 83519 RIA NONANTIBODY: CPT

## 2019-10-09 PROCEDURE — 80048 BASIC METABOLIC PNL TOTAL CA: CPT

## 2019-10-09 PROCEDURE — 85651 RBC SED RATE NONAUTOMATED: CPT

## 2019-10-09 PROCEDURE — 86255 FLUORESCENT ANTIBODY SCREEN: CPT

## 2019-10-09 PROCEDURE — 83516 IMMUNOASSAY NONANTIBODY: CPT

## 2019-10-09 PROCEDURE — 36415 COLL VENOUS BLD VENIPUNCTURE: CPT

## 2019-10-09 PROCEDURE — 86141 C-REACTIVE PROTEIN HS: CPT

## 2019-10-09 PROCEDURE — 85027 COMPLETE CBC AUTOMATED: CPT

## 2019-10-09 PROCEDURE — 80061 LIPID PANEL: CPT

## 2019-10-09 PROCEDURE — 83700 LIPOPRO BLD ELECTROPHORETIC: CPT

## 2019-10-14 LAB
ACETYLCHOL BLOCK AB: 12 % (ref 0–26)
ACETYLCHOLINE BINDING ANTIBODY: 0 NMOL/L (ref 0–0.4)
STRIATED MUSCLE AB, IGG SCREEN: ABNORMAL
TITIN ANTIBODY: 0.56 IV (ref 0–0.45)

## 2019-10-16 LAB
APPEARANCE (ELECTROPHORESIS): CLEAR
CHOLESTEROL (ELECTROPHORESIS): 199 MG/DL
HDL (ELECTROPHORESIS): 56 MG/DL (ref 40–59)
LDL (ELECTROPHORESIS): 129 MG/DL (ref 0–129)
LIPOPROTEIN ELECTROP (ELECTROPHORESIS): NORMAL
TRIGLYCERIDE (ELECTROPHORESIS): 104 MG/DL (ref 30–149)
VLDL, CALCULATED (ELECTROPHORESIS): 14 MG/DL (ref 0–30)

## 2019-10-21 ENCOUNTER — TELEPHONE (OUTPATIENT)
Dept: FAMILY MEDICINE CLINIC | Age: 84
End: 2019-10-21

## 2019-10-21 DIAGNOSIS — N63.41 SUBAREOLAR MASS OF RIGHT BREAST: Primary | ICD-10-CM

## 2019-10-23 ENCOUNTER — HOSPITAL ENCOUNTER (OUTPATIENT)
Dept: WOMENS IMAGING | Age: 84
Discharge: HOME | End: 2019-10-25
Payer: MEDICARE

## 2019-10-23 ENCOUNTER — HOSPITAL ENCOUNTER (OUTPATIENT)
Dept: ULTRASOUND IMAGING | Age: 84
Discharge: HOME | End: 2019-10-25
Payer: MEDICARE

## 2019-10-23 VITALS
HEART RATE: 78 BPM | DIASTOLIC BLOOD PRESSURE: 94 MMHG | OXYGEN SATURATION: 98 % | RESPIRATION RATE: 12 BRPM | SYSTOLIC BLOOD PRESSURE: 179 MMHG

## 2019-10-23 DIAGNOSIS — N63.0 BREAST LUMP: ICD-10-CM

## 2019-10-23 DIAGNOSIS — N63.41 SUBAREOLAR MASS OF RIGHT BREAST: ICD-10-CM

## 2019-10-23 DIAGNOSIS — N63.10 BREAST MASS, RIGHT: ICD-10-CM

## 2019-10-23 PROCEDURE — 76642 ULTRASOUND BREAST LIMITED: CPT

## 2019-10-23 PROCEDURE — 88360 TUMOR IMMUNOHISTOCHEM/MANUAL: CPT

## 2019-10-23 PROCEDURE — 2500000003 HC RX 250 WO HCPCS: Performed by: RADIOLOGY

## 2019-10-23 PROCEDURE — 77065 DX MAMMO INCL CAD UNI: CPT

## 2019-10-23 PROCEDURE — G0279 TOMOSYNTHESIS, MAMMO: HCPCS

## 2019-10-23 PROCEDURE — 88377 M/PHMTRC ALYS ISHQUANT/SEMIQ: CPT

## 2019-10-23 PROCEDURE — A4648 IMPLANTABLE TISSUE MARKER: HCPCS

## 2019-10-23 PROCEDURE — 88305 TISSUE EXAM BY PATHOLOGIST: CPT

## 2019-10-23 RX ORDER — LIDOCAINE HYDROCHLORIDE 10 MG/ML
INJECTION, SOLUTION EPIDURAL; INFILTRATION; INTRACAUDAL; PERINEURAL
Status: COMPLETED | OUTPATIENT
Start: 2019-10-23 | End: 2019-10-23

## 2019-10-23 RX ADMIN — LIDOCAINE HYDROCHLORIDE 10 ML: 10 INJECTION, SOLUTION EPIDURAL; INFILTRATION; INTRACAUDAL; PERINEURAL at 11:13

## 2019-10-23 RX ADMIN — LIDOCAINE HYDROCHLORIDE 5 ML: 10 INJECTION, SOLUTION EPIDURAL; INFILTRATION; INTRACAUDAL; PERINEURAL at 10:46

## 2019-10-23 RX ADMIN — LIDOCAINE HYDROCHLORIDE 5 ML: 10 INJECTION, SOLUTION EPIDURAL; INFILTRATION; INTRACAUDAL; PERINEURAL at 10:47

## 2019-10-28 LAB — SURGICAL PATHOLOGY REPORT: NORMAL

## 2019-11-01 ENCOUNTER — TELEPHONE (OUTPATIENT)
Dept: FAMILY MEDICINE CLINIC | Age: 84
End: 2019-11-01

## 2019-11-01 DIAGNOSIS — N63.10 BREAST MASS, RIGHT: Primary | ICD-10-CM

## 2019-11-07 LAB
ALBUMIN SERPL-MCNC: 4 G/DL
ALP BLD-CCNC: 73 U/L
ALT SERPL-CCNC: 15 U/L
ANION GAP SERPL CALCULATED.3IONS-SCNC: 11 MMOL/L
AST SERPL-CCNC: 22 U/L
BASOPHILS ABSOLUTE: 0 /ΜL
BASOPHILS RELATIVE PERCENT: 0.5 %
BILIRUB SERPL-MCNC: 0.7 MG/DL (ref 0.1–1.4)
BUN BLDV-MCNC: 17 MG/DL
CALCIUM SERPL-MCNC: NORMAL MG/DL
CHLORIDE BLD-SCNC: 104 MMOL/L
CO2: 30 MMOL/L
CREAT SERPL-MCNC: 0.68 MG/DL
EOSINOPHILS ABSOLUTE: 0.3 /ΜL
EOSINOPHILS RELATIVE PERCENT: 3.3 %
GFR CALCULATED: NORMAL
GLUCOSE BLD-MCNC: 91 MG/DL
HCT VFR BLD CALC: 35.2 % (ref 36–46)
HEMOGLOBIN: 11.9 G/DL (ref 12–16)
LYMPHOCYTES ABSOLUTE: 1.5 /ΜL
LYMPHOCYTES RELATIVE PERCENT: 18.7 %
MCH RBC QN AUTO: 30.7 PG
MCHC RBC AUTO-ENTMCNC: 33.9 G/DL
MCV RBC AUTO: 90.5 FL
MONOCYTES ABSOLUTE: 0.6 /ΜL
MONOCYTES RELATIVE PERCENT: 7.2 %
NEUTROPHILS ABSOLUTE: 5.8 /ΜL
NEUTROPHILS RELATIVE PERCENT: 70.3 %
PLATELET # BLD: 236 K/ΜL
PMV BLD AUTO: 8 FL
POTASSIUM SERPL-SCNC: 4.3 MMOL/L
RBC # BLD: 3.89 10^6/ΜL
SODIUM BLD-SCNC: 141 MMOL/L
TOTAL PROTEIN: 7
WBC # BLD: 8.3 10^3/ML

## 2020-07-10 ENCOUNTER — HOSPITAL ENCOUNTER (OUTPATIENT)
Age: 85
Setting detail: SPECIMEN
Discharge: HOME | End: 2020-07-10
Payer: MEDICARE

## 2020-07-10 PROCEDURE — U0003 INFECTIOUS AGENT DETECTION BY NUCLEIC ACID (DNA OR RNA); SEVERE ACUTE RESPIRATORY SYNDROME CORONAVIRUS 2 (SARS-COV-2) (CORONAVIRUS DISEASE [COVID-19]), AMPLIFIED PROBE TECHNIQUE, MAKING USE OF HIGH THROUGHPUT TECHNOLOGIES AS DESCRIBED BY CMS-2020-01-R: HCPCS

## 2020-07-15 LAB — SARS-COV-2, NAA: NOT DETECTED

## 2020-08-28 ENCOUNTER — TELEPHONE (OUTPATIENT)
Dept: FAMILY MEDICINE CLINIC | Age: 85
End: 2020-08-28

## 2020-08-28 NOTE — TELEPHONE ENCOUNTER
Pt was called and scheduled for her [facility] wellness exam that she was due for  Labs ordered to be done prior to her appoitnment

## 2020-10-02 ENCOUNTER — OFFICE VISIT (OUTPATIENT)
Dept: FAMILY MEDICINE CLINIC | Age: 85
End: 2020-10-02
Payer: MEDICARE

## 2020-10-02 ENCOUNTER — HOSPITAL ENCOUNTER (OUTPATIENT)
Age: 85
Discharge: HOME | End: 2020-10-02
Payer: MEDICARE

## 2020-10-02 VITALS
HEIGHT: 64 IN | SYSTOLIC BLOOD PRESSURE: 132 MMHG | BODY MASS INDEX: 24.41 KG/M2 | DIASTOLIC BLOOD PRESSURE: 86 MMHG | WEIGHT: 143 LBS

## 2020-10-02 LAB
ALT SERPL-CCNC: 14 U/L (ref 5–33)
ANION GAP SERPL CALCULATED.3IONS-SCNC: 8 MMOL/L (ref 9–17)
AST SERPL-CCNC: 19 U/L
BUN BLDV-MCNC: 13 MG/DL (ref 8–23)
BUN/CREAT BLD: 20 (ref 9–20)
CALCIUM SERPL-MCNC: 9.8 MG/DL (ref 8.6–10.4)
CHLORIDE BLD-SCNC: 104 MMOL/L (ref 98–107)
CHOLESTEROL/HDL RATIO: 3.4
CHOLESTEROL: 204 MG/DL
CO2: 27 MMOL/L (ref 20–31)
CREAT SERPL-MCNC: 0.66 MG/DL (ref 0.5–0.9)
GFR AFRICAN AMERICAN: >60 ML/MIN
GFR NON-AFRICAN AMERICAN: >60 ML/MIN
GFR SERPL CREATININE-BSD FRML MDRD: ABNORMAL ML/MIN/{1.73_M2}
GFR SERPL CREATININE-BSD FRML MDRD: ABNORMAL ML/MIN/{1.73_M2}
GLUCOSE BLD-MCNC: 96 MG/DL (ref 70–99)
HCT VFR BLD CALC: 38.3 % (ref 36.3–47.1)
HDLC SERPL-MCNC: 60 MG/DL
HEMOGLOBIN: 12.2 G/DL (ref 11.9–15.1)
LDL CHOLESTEROL: 123 MG/DL (ref 0–130)
MCH RBC QN AUTO: 29.8 PG (ref 25.2–33.5)
MCHC RBC AUTO-ENTMCNC: 31.9 G/DL (ref 28.4–34.8)
MCV RBC AUTO: 93.4 FL (ref 82.6–102.9)
NRBC AUTOMATED: 0 PER 100 WBC
PDW BLD-RTO: 13 % (ref 11.8–14.4)
PLATELET # BLD: 219 K/UL (ref 138–453)
PMV BLD AUTO: 9.4 FL (ref 8.1–13.5)
POTASSIUM SERPL-SCNC: 4.4 MMOL/L (ref 3.7–5.3)
RBC # BLD: 4.1 M/UL (ref 3.95–5.11)
SODIUM BLD-SCNC: 139 MMOL/L (ref 135–144)
TRIGL SERPL-MCNC: 103 MG/DL
VLDLC SERPL CALC-MCNC: ABNORMAL MG/DL (ref 1–30)
WBC # BLD: 8.4 K/UL (ref 3.5–11.3)

## 2020-10-02 PROCEDURE — 80061 LIPID PANEL: CPT

## 2020-10-02 PROCEDURE — 84460 ALANINE AMINO (ALT) (SGPT): CPT

## 2020-10-02 PROCEDURE — 1123F ACP DISCUSS/DSCN MKR DOCD: CPT | Performed by: FAMILY MEDICINE

## 2020-10-02 PROCEDURE — G8484 FLU IMMUNIZE NO ADMIN: HCPCS | Performed by: FAMILY MEDICINE

## 2020-10-02 PROCEDURE — 85027 COMPLETE CBC AUTOMATED: CPT

## 2020-10-02 PROCEDURE — 36415 COLL VENOUS BLD VENIPUNCTURE: CPT

## 2020-10-02 PROCEDURE — 99211 OFF/OP EST MAY X REQ PHY/QHP: CPT | Performed by: FAMILY MEDICINE

## 2020-10-02 PROCEDURE — G0439 PPPS, SUBSEQ VISIT: HCPCS | Performed by: FAMILY MEDICINE

## 2020-10-02 PROCEDURE — 84450 TRANSFERASE (AST) (SGOT): CPT

## 2020-10-02 PROCEDURE — 4040F PNEUMOC VAC/ADMIN/RCVD: CPT | Performed by: FAMILY MEDICINE

## 2020-10-02 PROCEDURE — 80048 BASIC METABOLIC PNL TOTAL CA: CPT

## 2020-10-02 ASSESSMENT — PATIENT HEALTH QUESTIONNAIRE - PHQ9
SUM OF ALL RESPONSES TO PHQ9 QUESTIONS 1 & 2: 0
2. FEELING DOWN, DEPRESSED OR HOPELESS: 0
1. LITTLE INTEREST OR PLEASURE IN DOING THINGS: 0
SUM OF ALL RESPONSES TO PHQ QUESTIONS 1-9: 0
SUM OF ALL RESPONSES TO PHQ QUESTIONS 1-9: 0

## 2020-10-02 ASSESSMENT — LIFESTYLE VARIABLES: HOW OFTEN DO YOU HAVE A DRINK CONTAINING ALCOHOL: 0

## 2020-10-02 NOTE — PATIENT INSTRUCTIONS
SURVEY:    You may be receiving a survey from [employer] regarding your visit today. Please complete the survey to enable us to provide the highest quality of care to you and your family. If you cannot score us a very good on any question, please call the office to discuss how we could of made your experience a very good one. Thank you.

## 2020-10-02 NOTE — PROGRESS NOTES
11/2014    left, hairline    Hyperlipidemia     Hypertension     Menopausal state 1948    ovarian cyst    Osteoporosis 2009    Sinus disorder        Past Surgical History:   Procedure Laterality Date    COLONOSCOPY  11/30/2016    -hemorrhoids    CYST REMOVAL      scalp, sebaceous, head/neck    LAPAROSCOPIC APPENDECTOMY  09/01/2018    with Dr. Betty Fisher.  [place] LAP,APPENDECTOMY N/A 9/1/2018    APPENDECTOMY LAPAROSCOPIC performed by Sherrie Valerio MD at [address] FX+INTRAMED SHREYAS Left 5/13/2018    FEMUR IM NAIL SHREYAS INSERTION performed by Colletta Jaegers, MD at [address] History   Problem Relation Age of Onset    Stroke Mother     Cancer Father         bladder     ROS:  General Constitutional: Denies chills. Denies fever. Denies headache. Denies lightheadedness. Ophthalmologic: Denies blurred vision. ENT: Denies nasal congestion. Denies sore throat. Denies ear pain and pressure. Respiratory: Denies cough. Denies shortness of breath. Denies wheezing. Cardiovascular: Denies chest pain at rest. Denies irregular heartbeat. Denies palpitations. Gastrointestinal: Denies abdominal pain. Denies blood in the stool. Denies constipation. Denies diarrhea. Denies nausea. Denies vomiting. Admits bloating, had a negative workup with Dr. Nj Ramos  Genitourinary: Denies blood in the urine. Denies difficulty urinating. Denies frequent urination. Denies painful urination. Denies urinary incontinence  Musculoskeletal: Denies muscle aches. Denies painful joints. Denies swollen joints. Peripheral Vascular: Denies pain/cramping in legs after exertion. Skin: Denies dry skin. Denies itching. Denies rash. Neurologic: Denies falls. Denies dizziness. Denies fainting. Denies tingling/numbness. Psychiatric: Denies sleep disturbance. Denies anxiety. Denies depressed mood.       CareTeam (Including outside providers/suppliers regularly involved in providing care):   Patient Care Team:  Susi Payan

## 2021-02-02 ENCOUNTER — HOSPITAL ENCOUNTER (OUTPATIENT)
Dept: WOMENS IMAGING | Age: 86
Discharge: HOME | End: 2021-02-04
Payer: MEDICARE

## 2021-02-02 DIAGNOSIS — Z12.39 SCREENING BREAST EXAMINATION: ICD-10-CM

## 2021-02-02 PROCEDURE — 77063 BREAST TOMOSYNTHESIS BI: CPT

## 2021-10-05 ENCOUNTER — OFFICE VISIT (OUTPATIENT)
Dept: FAMILY MEDICINE CLINIC | Age: 86
End: 2021-10-05
Payer: MEDICARE

## 2021-10-05 VITALS
BODY MASS INDEX: 24.04 KG/M2 | SYSTOLIC BLOOD PRESSURE: 136 MMHG | HEIGHT: 64 IN | DIASTOLIC BLOOD PRESSURE: 76 MMHG | WEIGHT: 140.8 LBS

## 2021-10-05 DIAGNOSIS — M40.04 POSTURAL KYPHOSIS OF THORACIC REGION: Chronic | ICD-10-CM

## 2021-10-05 DIAGNOSIS — I50.32 CHRONIC DIASTOLIC CHF (CONGESTIVE HEART FAILURE) (HCC): ICD-10-CM

## 2021-10-05 DIAGNOSIS — C50.919 BREAST CANCER STAGE T1B, GREATER THAN 0.5 CM AND LESS THAN OR EQUAL TO 1 CM IN GREATEST DIMENSION (HCC): ICD-10-CM

## 2021-10-05 DIAGNOSIS — M81.0 OSTEOPOROSIS, UNSPECIFIED OSTEOPOROSIS TYPE, UNSPECIFIED PATHOLOGICAL FRACTURE PRESENCE: Primary | ICD-10-CM

## 2021-10-05 PROBLEM — D62 ACUTE BLOOD LOSS AS CAUSE OF POSTOPERATIVE ANEMIA: Status: RESOLVED | Noted: 2018-05-14 | Resolved: 2021-10-05

## 2021-10-05 PROBLEM — G31.84 MCI (MILD COGNITIVE IMPAIRMENT): Status: RESOLVED | Noted: 2018-09-10 | Resolved: 2021-10-05

## 2021-10-05 PROBLEM — N63.10 BREAST MASS, RIGHT: Status: RESOLVED | Noted: 2019-10-08 | Resolved: 2021-10-05

## 2021-10-05 PROBLEM — H61.23 BILATERAL IMPACTED CERUMEN: Status: RESOLVED | Noted: 2019-03-12 | Resolved: 2021-10-05

## 2021-10-05 PROBLEM — K35.80 ACUTE APPENDICITIS: Status: RESOLVED | Noted: 2018-09-01 | Resolved: 2021-10-05

## 2021-10-05 PROCEDURE — G0439 PPPS, SUBSEQ VISIT: HCPCS | Performed by: FAMILY MEDICINE

## 2021-10-05 PROCEDURE — 4040F PNEUMOC VAC/ADMIN/RCVD: CPT | Performed by: FAMILY MEDICINE

## 2021-10-05 PROCEDURE — 1123F ACP DISCUSS/DSCN MKR DOCD: CPT | Performed by: FAMILY MEDICINE

## 2021-10-05 PROCEDURE — 99211 OFF/OP EST MAY X REQ PHY/QHP: CPT | Performed by: FAMILY MEDICINE

## 2021-10-05 PROCEDURE — G8484 FLU IMMUNIZE NO ADMIN: HCPCS | Performed by: FAMILY MEDICINE

## 2021-10-05 RX ORDER — ZINC GLUCONATE 50 MG
50 TABLET ORAL DAILY
COMMUNITY

## 2021-10-05 RX ORDER — VIT C/B6/B5/MAGNESIUM/HERB 173 50-5-6-5MG
1 CAPSULE ORAL
COMMUNITY

## 2021-10-05 RX ORDER — UBIDECARENONE 200 MG
1 CAPSULE ORAL
COMMUNITY
End: 2022-10-06

## 2021-10-05 RX ORDER — POLYETHYLENE GLYCOL 3350 17 G/17G
POWDER, FOR SOLUTION ORAL
COMMUNITY

## 2021-10-05 SDOH — ECONOMIC STABILITY: FOOD INSECURITY: WITHIN THE PAST 12 MONTHS, YOU WORRIED THAT YOUR FOOD WOULD RUN OUT BEFORE YOU GOT MONEY TO BUY MORE.: NEVER TRUE

## 2021-10-05 SDOH — ECONOMIC STABILITY: FOOD INSECURITY: WITHIN THE PAST 12 MONTHS, THE FOOD YOU BOUGHT JUST DIDN'T LAST AND YOU DIDN'T HAVE MONEY TO GET MORE.: NEVER TRUE

## 2021-10-05 ASSESSMENT — SOCIAL DETERMINANTS OF HEALTH (SDOH): HOW HARD IS IT FOR YOU TO PAY FOR THE VERY BASICS LIKE FOOD, HOUSING, MEDICAL CARE, AND HEATING?: NOT HARD AT ALL

## 2021-10-05 ASSESSMENT — PATIENT HEALTH QUESTIONNAIRE - PHQ9
2. FEELING DOWN, DEPRESSED OR HOPELESS: 0
SUM OF ALL RESPONSES TO PHQ QUESTIONS 1-9: 0
SUM OF ALL RESPONSES TO PHQ9 QUESTIONS 1 & 2: 0
SUM OF ALL RESPONSES TO PHQ QUESTIONS 1-9: 0
SUM OF ALL RESPONSES TO PHQ QUESTIONS 1-9: 0
1. LITTLE INTEREST OR PLEASURE IN DOING THINGS: 0

## 2021-10-05 ASSESSMENT — LIFESTYLE VARIABLES: HOW OFTEN DO YOU HAVE A DRINK CONTAINING ALCOHOL: 0

## 2021-10-05 NOTE — PROGRESS NOTES
Pain Yes Historical Provider, MD   Ascorbic Acid (VITAMIN C) 250 MG tablet Take 250 mg by mouth daily Yes Historical Provider, MD   vitamin D (CHOLECALCIFEROL) 1000 UNIT TABS tablet Take 1,000 Units by mouth daily Yes Historical Provider, MD   MAGNESIUM PO Take by mouth daily Yes Historical Provider, MD   calcium-vitamin D (OSCAL-500) 500-200 MG-UNIT per tablet Take 1 tablet by mouth 2 times daily Yes Paul Franco PA-C   Loratadine (CLARITIN) 10 MG CAPS Take  by mouth daily. Yes Historical Provider, MD   hydrocortisone (ANUSOL-HC) 25 MG suppository Place 1 suppository rectally every 12 hours  Patient not taking: Reported on 10/5/2021  Brant Meigs, MD         Past Medical History:   Diagnosis Date    Asthma     Ductal carcinoma ([facility] [address] 10/2019    Stage 1B, mastectomy, [place] Lymph node Bx 11/19. was on tamoxifen for short while    Eczema     History of femur fracture 11/2014    left, hairline    Hyperlipidemia     Hypertension     Menopausal state 1948    ovarian cyst    Osteoporosis 2009    Sinus disorder        Past Surgical History:   Procedure Laterality Date    COLONOSCOPY  11/30/2016    -hemorrhoids    CYST REMOVAL      scalp, sebaceous, head/neck    LAPAROSCOPIC APPENDECTOMY  09/01/2018    with Dr. Kenny Forward.     [place] LAP,APPENDECTOMY N/A 9/1/2018    APPENDECTOMY LAPAROSCOPIC performed by Josse Bolivar MD at [address] FX+INTRAMED SHREYAS Left 5/13/2018    FEMUR IM NAIL SHREYAS INSERTION performed by Rosangela Delgado MD at [facility]         Family History   Problem Relation Age of Onset    Stroke Mother     Cancer Father         bladder       CareTeam (Including outside providers/suppliers regularly involved in providing care):   Patient Care Team:  Brant Meigs, MD as PCP - Tim Resendez MD as PCP - [facility] Empaneled Provider    Wt Readings from Last 3 Encounters:   10/05/21 140 lb 12.8 oz (63.9 kg)   10/02/20 143 lb (64.9 kg)   10/08/19 140 lb (63.5 kg)     Vitals: 10/05/21 1405   BP: 136/76   Weight: 140 lb 12.8 oz (63.9 kg)   Height: 5' 4\" (1.626 m)     Body mass index is 24.17 kg/m². Based upon direct observation of the patient, evaluation of cognition reveals recent and remote memory intact. General Appearance: alert and oriented to person, place and time, well developed and well- nourished, in no acute distress  Skin: warm and dry, no rash or erythema  Head: normocephalic and atraumatic  Eyes: pupils equal, round, and reactive to light, extraocular eye movements intact, conjunctivae normal  ENT: tympanic membrane, external ear and ear canal normal bilaterally, nose without deformity, nasal mucosa and turbinates normal without polyps  Neck: supple and non-tender without mass, no thyromegaly or thyroid nodules, no cervical lymphadenopathy  Pulmonary/Chest: clear to auscultation bilaterally- no wheezes, rales or rhonchi, normal air movement, no respiratory distress  Cardiovascular: normal rate, regular rhythm, normal S1 and S2, no rubs, clicks, or gallops, distal pulses intact, no carotid bruits Rate: 80 3/6 aortic outflow tract murmor. Abdomen: soft, non-tender, non-distended, normal bowel sounds, no masses or organomegaly  Extremities: no cyanosis, clubbing or edema minor stasis changes with hemosiderin deposits. Musculoskeletal: normal range of motion, no joint swelling, deformity or tenderness  Neurologic: reflexes normal and symmetric, no cranial nerve deficit, gait, coordination and speech normal    Patient's complete Health Risk Assessment and screening values have been reviewed and are found in Flowsheets. The following problems were reviewed today and where indicated follow up appointments were made and/or referrals ordered.     Positive Risk Factor Screenings with Interventions:            Hearing/Vision:  No exam data present  Hearing/Vision  Do you or your family notice any trouble with your hearing that hasn't been managed with hearing aids?: No  Do you have difficulty driving, watching TV, or doing any of your daily activities because of your eyesight?: No  Have you had an eye exam within the past year?: (!) No      No Positive Risk Factors identified today. Personalized Preventive Plan   Current Health Maintenance Status  Immunization History   Administered Date(s) Administered    Influenza Virus Vaccine 09/01/2017    Td (Adult), 5 Lf Tetanus Toxoid, Pf (Tenivac, Decavac) 08/26/2010        Health Maintenance   Topic Date Due    COVID-19 Vaccine (1) Never done    Shingles Vaccine (1 of 2) Never done    Pneumococcal 65+ years Vaccine (1 of 1 - PPSV23) Never done    DTaP/Tdap/Td vaccine (1 - Tdap) 08/27/2010    Annual Wellness Visit (AWV)  Never done    Flu vaccine (1) 09/01/2021    Hepatitis A vaccine  Aged Out    Hepatitis B vaccine  Aged Out    Hib vaccine  Aged Out    Meningococcal (ACWY) vaccine  Aged Out     Recommendations for IES Due: see orders and patient instructions/AVS.  . Recommended screening schedule for the next 5-10 years is provided to the patient in written form: see Patient Joy Jin was seen today for medicare awv, hypertension and hyperlipidemia. Diagnoses and all orders for this visit:    Osteoporosis, unspecified osteoporosis type, unspecified pathological fracture presence    Chronic diastolic CHF (congestive heart failure) (HCC)    Postural kyphosis of thoracic region    Breast cancer stage T1b, greater than 0.5 cm and less than or equal to 1 cm in greatest dimension [facility])  Comments:  mastectomy and sentinel node neg  no adjuvant chemo and no radiation  intolerant of hormonal treatment           Plan:  She saw Dr. Reny Perez last week. The CT chest that he ordered in June is reviewed. She states she was told that she is cancer free. She refuses all vaccines, today. She looks great. I will not make any changes, today. I will see her back in 1 year.        I, Dr. Bettie Goodson, personally performed the services described in this documentation as scribed/transcribed by Roz Habermann, RMA in my presence, and is both accurate and complete.

## 2021-10-05 NOTE — PATIENT INSTRUCTIONS
Plan:  She saw Dr. Maicol Luis last week. The CT chest that he ordered in June is reviewed. She states she was told that she is cancer free. She refuses all vaccines, today. She looks great. I will not make any changes, today. I will see her back in 1 year. SURVEY:    You may be receiving a survey from [employer] regarding your visit today. Please complete the survey to enable us to provide the highest quality of care to you and your family. If you cannot score us a very good on any question, please call the office to discuss how we could of made your experience a very good one. Thank you.

## 2021-12-02 ENCOUNTER — APPOINTMENT (OUTPATIENT)
Dept: GENERAL RADIOLOGY | Age: 86
End: 2021-12-02
Payer: MEDICARE

## 2021-12-02 ENCOUNTER — HOSPITAL ENCOUNTER (EMERGENCY)
Age: 86
Discharge: HOME | End: 2021-12-02
Attending: STUDENT IN AN ORGANIZED HEALTH CARE EDUCATION/TRAINING PROGRAM
Payer: MEDICARE

## 2021-12-02 ENCOUNTER — NURSE TRIAGE (OUTPATIENT)
Dept: OTHER | Facility: CLINIC | Age: 86
End: 2021-12-02

## 2021-12-02 VITALS
WEIGHT: 135 LBS | BODY MASS INDEX: 25.49 KG/M2 | SYSTOLIC BLOOD PRESSURE: 152 MMHG | RESPIRATION RATE: 18 BRPM | TEMPERATURE: 98.2 F | DIASTOLIC BLOOD PRESSURE: 93 MMHG | HEART RATE: 79 BPM | OXYGEN SATURATION: 100 % | HEIGHT: 61 IN

## 2021-12-02 DIAGNOSIS — J40 BRONCHITIS: ICD-10-CM

## 2021-12-02 DIAGNOSIS — R05.9 COUGH: Primary | ICD-10-CM

## 2021-12-02 LAB
ABSOLUTE EOS #: 0.38 K/UL (ref 0–0.44)
ABSOLUTE IMMATURE GRANULOCYTE: 0 K/UL (ref 0–0.3)
ABSOLUTE LYMPH #: 2.28 K/UL (ref 1.1–3.7)
ABSOLUTE MONO #: 0.86 K/UL (ref 0.1–1.2)
ANION GAP SERPL CALCULATED.3IONS-SCNC: 12 MMOL/L (ref 9–17)
BASOPHILS # BLD: 1 % (ref 0–2)
BASOPHILS ABSOLUTE: 0.1 K/UL (ref 0–0.2)
BNP INTERPRETATION: ABNORMAL
BUN BLDV-MCNC: 14 MG/DL (ref 8–23)
BUN/CREAT BLD: 31 (ref 9–20)
CALCIUM SERPL-MCNC: 9.8 MG/DL (ref 8.6–10.4)
CHLORIDE BLD-SCNC: 101 MMOL/L (ref 98–107)
CO2: 23 MMOL/L (ref 20–31)
CREAT SERPL-MCNC: 0.45 MG/DL (ref 0.5–0.9)
DIFFERENTIAL TYPE: NORMAL
DIRECT EXAM: NORMAL
EKG ATRIAL RATE: 73 BPM
EKG P AXIS: 60 DEGREES
EKG P-R INTERVAL: 176 MS
EKG Q-T INTERVAL: 382 MS
EKG QRS DURATION: 94 MS
EKG QTC CALCULATION (BAZETT): 420 MS
EKG R AXIS: 25 DEGREES
EKG T AXIS: 63 DEGREES
EKG VENTRICULAR RATE: 73 BPM
EOSINOPHILS RELATIVE PERCENT: 4 % (ref 1–4)
GFR AFRICAN AMERICAN: >60 ML/MIN
GFR NON-AFRICAN AMERICAN: >60 ML/MIN
GFR SERPL CREATININE-BSD FRML MDRD: ABNORMAL ML/MIN/{1.73_M2}
GFR SERPL CREATININE-BSD FRML MDRD: ABNORMAL ML/MIN/{1.73_M2}
GLUCOSE BLD-MCNC: 112 MG/DL (ref 70–99)
HCT VFR BLD CALC: 40.8 % (ref 36.3–47.1)
HEMOGLOBIN: 13.3 G/DL (ref 11.9–15.1)
IMMATURE GRANULOCYTES: 0 %
LYMPHOCYTES # BLD: 24 % (ref 24–43)
Lab: NORMAL
MAGNESIUM: 2.3 MG/DL (ref 1.6–2.6)
MCH RBC QN AUTO: 29.6 PG (ref 25.2–33.5)
MCHC RBC AUTO-ENTMCNC: 32.6 G/DL (ref 28.4–34.8)
MCV RBC AUTO: 90.9 FL (ref 82.6–102.9)
MONOCYTES # BLD: 9 % (ref 3–12)
MORPHOLOGY: NORMAL
MORPHOLOGY: NORMAL
NRBC AUTOMATED: 0 PER 100 WBC
PDW BLD-RTO: 13.1 % (ref 11.8–14.4)
PLATELET # BLD: NORMAL K/UL (ref 138–453)
PLATELET ESTIMATE: NORMAL
PLATELET, FLUORESCENCE: 146 K/UL (ref 138–453)
PLATELET, IMMATURE FRACTION: 5.1 % (ref 1.1–10.3)
PMV BLD AUTO: NORMAL FL (ref 8.1–13.5)
POTASSIUM SERPL-SCNC: 4.4 MMOL/L (ref 3.7–5.3)
PRO-BNP: 324 PG/ML
RBC # BLD: 4.49 M/UL (ref 3.95–5.11)
RBC # BLD: NORMAL 10*6/UL
SARS-COV-2, RAPID: NOT DETECTED
SEG NEUTROPHILS: 62 % (ref 36–65)
SEGMENTED NEUTROPHILS ABSOLUTE COUNT: 5.88 K/UL (ref 1.5–8.1)
SODIUM BLD-SCNC: 136 MMOL/L (ref 135–144)
SPECIMEN DESCRIPTION: NORMAL
SPECIMEN DESCRIPTION: NORMAL
TROPONIN INTERP: ABNORMAL
TROPONIN T: ABNORMAL NG/ML
TROPONIN, HIGH SENSITIVITY: 22 NG/L (ref 0–14)
WBC # BLD: 9.5 K/UL (ref 3.5–11.3)
WBC # BLD: NORMAL 10*3/UL

## 2021-12-02 PROCEDURE — 80048 BASIC METABOLIC PNL TOTAL CA: CPT

## 2021-12-02 PROCEDURE — 94640 AIRWAY INHALATION TREATMENT: CPT

## 2021-12-02 PROCEDURE — 85025 COMPLETE CBC W/AUTO DIFF WBC: CPT

## 2021-12-02 PROCEDURE — 93010 ELECTROCARDIOGRAM REPORT: CPT | Performed by: FAMILY MEDICINE

## 2021-12-02 PROCEDURE — 87635 SARS-COV-2 COVID-19 AMP PRB: CPT

## 2021-12-02 PROCEDURE — 83735 ASSAY OF MAGNESIUM: CPT

## 2021-12-02 PROCEDURE — 94664 DEMO&/EVAL PT USE INHALER: CPT

## 2021-12-02 PROCEDURE — 93005 ELECTROCARDIOGRAM TRACING: CPT | Performed by: STUDENT IN AN ORGANIZED HEALTH CARE EDUCATION/TRAINING PROGRAM

## 2021-12-02 PROCEDURE — 87804 INFLUENZA ASSAY W/OPTIC: CPT

## 2021-12-02 PROCEDURE — 36415 COLL VENOUS BLD VENIPUNCTURE: CPT

## 2021-12-02 PROCEDURE — 6360000002 HC RX W HCPCS: Performed by: PHYSICIAN ASSISTANT

## 2021-12-02 PROCEDURE — 6360000002 HC RX W HCPCS: Performed by: STUDENT IN AN ORGANIZED HEALTH CARE EDUCATION/TRAINING PROGRAM

## 2021-12-02 PROCEDURE — 99283 EMERGENCY DEPT VISIT LOW MDM: CPT

## 2021-12-02 PROCEDURE — 85055 RETICULATED PLATELET ASSAY: CPT

## 2021-12-02 PROCEDURE — 71045 X-RAY EXAM CHEST 1 VIEW: CPT

## 2021-12-02 PROCEDURE — 84484 ASSAY OF TROPONIN QUANT: CPT

## 2021-12-02 PROCEDURE — 83880 ASSAY OF NATRIURETIC PEPTIDE: CPT

## 2021-12-02 RX ORDER — PREDNISONE 50 MG/1
50 TABLET ORAL DAILY
Qty: 5 TABLET | Refills: 0 | Status: SHIPPED | OUTPATIENT
Start: 2021-12-02 | End: 2021-12-07

## 2021-12-02 RX ORDER — BUDESONIDE 0.5 MG/2ML
0.5 INHALANT ORAL ONCE
Status: COMPLETED | OUTPATIENT
Start: 2021-12-02 | End: 2021-12-02

## 2021-12-02 RX ADMIN — ALBUTEROL SULFATE 5 MG: 2.5 SOLUTION RESPIRATORY (INHALATION) at 21:45

## 2021-12-02 RX ADMIN — BUDESONIDE 500 MCG: 0.5 SUSPENSION RESPIRATORY (INHALATION) at 21:56

## 2021-12-02 ASSESSMENT — ENCOUNTER SYMPTOMS
DIARRHEA: 0
VOMITING: 0
CHEST TIGHTNESS: 0
NAUSEA: 0
RHINORRHEA: 0
EYE REDNESS: 0
SORE THROAT: 0
COUGH: 1
ABDOMINAL PAIN: 0
EYE DISCHARGE: 0
SHORTNESS OF BREATH: 0
CONSTIPATION: 0
BACK PAIN: 0
WHEEZING: 0
BLOOD IN STOOL: 0

## 2021-12-02 NOTE — TELEPHONE ENCOUNTER
Reason for Disposition   MILD difficulty breathing (e.g., minimal/no SOB at rest, SOB with walking, pulse < 100) of new onset or worse than normal    Answer Assessment - Initial Assessment Questions  1. RESPIRATORY STATUS: \"Describe your breathing? \" (e.g., wheezing, shortness of breath, unable to speak, severe coughing)       Huffing and puffing even just walking across the yard. I cough when I take deep breaths. Wheezing    2. ONSET: \"When did this breathing problem begin? \"       A month ago, it started with a head cold, but the chest congestion stayed. 3. PATTERN \"Does the difficult breathing come and go, or has it been constant since it started? \"       It's okay when I'm sitting. 4. SEVERITY: \"How bad is your breathing? \" (e.g., mild, moderate, severe)     - MILD: No SOB at rest, mild SOB with walking, speaks normally in sentences, can lay down, no retractions, pulse < 100.     - MODERATE: SOB at rest, SOB with minimal exertion and prefers to sit, cannot lie down flat, speaks in phrases, mild retractions, audible wheezing, pulse 100-120.     - SEVERE: Very SOB at rest, speaks in single words, struggling to breathe, sitting hunched forward, retractions, pulse > 120       Moderate    5. RECURRENT SYMPTOM: \"Have you had difficulty breathing before? \" If so, ask: \"When was the last time? \" and \"What happened that time? \"       Not this bad. 6. CARDIAC HISTORY: \"Do you have any history of heart disease? \" (e.g., heart attack, angina, bypass surgery, angioplasty)       Denies    7. LUNG HISTORY: \"Do you have any history of lung disease? \"  (e.g., pulmonary embolus, asthma, emphysema)     Denies    8. CAUSE: \"What do you think is causing the breathing problem? \"       Cold, damp air maybe. 9. OTHER SYMPTOMS: \"Do you have any other symptoms? (e.g., dizziness, runny nose, cough, chest pain, fever)      Cough with deep breaths, back pain with coughing-  HAS been taking Robitussin for 4 WEEKS., Wheezing.     10. PREGNANCY: \"Is there any chance you are pregnant? \" \"When was your last menstrual period? \"        No    11. TRAVEL: \"Have you traveled out of the country in the last month? \" (e.g., travel history, exposures)        No    Protocols used: BREATHING DIFFICULTY-ADULT-OH    Received call from April at [facility] with [employer]. Brief description of triage: SOB with exertion, wheezing at times. Robitussin x 4 weeks    Triage indicates for patient to the office now. Walk in clinic if no appts. Care advice provided, patient verbalizes understanding; denies any other questions or concerns; instructed to call back for any new or worsening symptoms. Writer provided warm transfer to Nayely David at [facility] for appointment scheduling. Attention Provider: Thank you for allowing me to participate in the care of your patient. The patient was connected to triage in response to information provided to the ECC/PSC. Please do not respond through this encounter as the response is not directed to a shared pool.

## 2021-12-03 ENCOUNTER — TELEPHONE (OUTPATIENT)
Dept: FAMILY MEDICINE CLINIC | Age: 86
End: 2021-12-03

## 2021-12-03 NOTE — TELEPHONE ENCOUNTER
----- Message from [employer] sent at 12/2/2021  5:05 PM EST -----  Subject: Message to Provider    QUESTIONS  Information for Provider? PT called to speak with ofc about being seen   today. Was told someone would call. Didn't receive call back. Please   follow up with PT.  ---------------------------------------------------------------------------  --------------  CALL BACK INFO  What is the best way for the office to contact you?  OK to leave message on   voicemail  Preferred Call Back Phone Number? [phone]  ---------------------------------------------------------------------------  --------------  SCRIPT ANSWERS  undefined

## 2021-12-03 NOTE — TELEPHONE ENCOUNTER
Patient was seen in the ER and she was able to get the medications prescribed. Medications are helping.

## 2021-12-06 ENCOUNTER — OFFICE VISIT (OUTPATIENT)
Dept: FAMILY MEDICINE CLINIC | Age: 86
End: 2021-12-06
Payer: MEDICARE

## 2021-12-06 VITALS
DIASTOLIC BLOOD PRESSURE: 80 MMHG | SYSTOLIC BLOOD PRESSURE: 130 MMHG | BODY MASS INDEX: 26.43 KG/M2 | OXYGEN SATURATION: 97 % | TEMPERATURE: 97 F | WEIGHT: 140 LBS | HEIGHT: 61 IN | RESPIRATION RATE: 16 BRPM | HEART RATE: 72 BPM

## 2021-12-06 DIAGNOSIS — M67.40 GANGLION CYST: Primary | ICD-10-CM

## 2021-12-06 PROCEDURE — G8427 DOCREV CUR MEDS BY ELIG CLIN: HCPCS | Performed by: NURSE PRACTITIONER

## 2021-12-06 PROCEDURE — G8417 CALC BMI ABV UP PARAM F/U: HCPCS | Performed by: NURSE PRACTITIONER

## 2021-12-06 PROCEDURE — 1123F ACP DISCUSS/DSCN MKR DOCD: CPT | Performed by: NURSE PRACTITIONER

## 2021-12-06 PROCEDURE — 99213 OFFICE O/P EST LOW 20 MIN: CPT | Performed by: NURSE PRACTITIONER

## 2021-12-06 PROCEDURE — G8484 FLU IMMUNIZE NO ADMIN: HCPCS | Performed by: NURSE PRACTITIONER

## 2021-12-06 PROCEDURE — 1036F TOBACCO NON-USER: CPT | Performed by: NURSE PRACTITIONER

## 2021-12-06 PROCEDURE — 1090F PRES/ABSN URINE INCON ASSESS: CPT | Performed by: NURSE PRACTITIONER

## 2021-12-06 PROCEDURE — 4040F PNEUMOC VAC/ADMIN/RCVD: CPT | Performed by: NURSE PRACTITIONER

## 2021-12-06 PROCEDURE — 99211 OFF/OP EST MAY X REQ PHY/QHP: CPT

## 2021-12-06 NOTE — PROGRESS NOTES
Name: Christina Jules  : 1932         Chief Complaint:     Chief Complaint   Patient presents with    Other     patient has growth on left thumb started on . states it is growing. denies itching or pain. History of Present Illness:      Christina Jules is a 80 y.o.  female who presents with Other (patient has growth on left thumb started on . states it is growing. denies itching or pain. )      HPI  The patient presents with a  growth on her left thumb that began 4 days ago. Denies pain or itching. Lesion is growing in size. Denies overlying skin changes. It does not inhibit range of motion. She has not had similar findings in the past.     Past Medical History:     Past Medical History:   Diagnosis Date    Asthma     Ductal carcinoma ([facility] [address] 10/2019    Stage 1B, mastectomy, [place] Lymph node Bx . was on tamoxifen for short while    Eczema     History of femur fracture 2014    left, hairline    Hyperlipidemia     Hypertension     Menopausal state     ovarian cyst    Osteoporosis     Sinus disorder       Reviewed all health maintenance requirements and ordered appropriate tests  Health Maintenance Due   Topic Date Due    COVID-19 Vaccine (1) Never done    Shingles Vaccine (1 of 2) Never done    Pneumococcal 65+ years Vaccine (1 of 1 - PPSV23) Never done    DTaP/Tdap/Td vaccine (1 - Tdap) 2010    Flu vaccine (1) 2021       Past Surgical History:     Past Surgical History:   Procedure Laterality Date    COLONOSCOPY  2016    -hemorrhoids    CYST REMOVAL      scalp, sebaceous, head/neck    LAPAROSCOPIC APPENDECTOMY  2018    with Dr. Sergei Stanford.     ME LAP,APPENDECTOMY N/A 2018    APPENDECTOMY LAPAROSCOPIC performed by Marleny Flood MD at [address] FX+INTRAMED SHREYAS Left 2018    FEMUR IM NAIL SHREYAS INSERTION performed by Armin Marie MD at [facility]        Medications:       Prior to Admission medications    Medication Sig Start Date End Date Taking? Authorizing Provider   predniSONE (DELTASONE) 50 MG tablet Take 1 tablet by mouth daily for 5 days 12/2/21 12/7/21 Yes More Arredondo PA-C   albuterol-ipratropium (COMBIVENT RESPIMAT)  MCG/ACT AERS inhaler Inhale 1 puff into the lungs every 6 hours 12/2/21  Yes Melba Gerard PA-C   Methylsulfonylmethane 1000 MG TABS Take by mouth   Yes Historical Provider, MD   [place] 150 MG CAPS Take 1 capsule by mouth   Yes Historical Provider, MD   polyethylene glycol (GLYCOLAX) 17 GM/SCOOP powder Take by mouth   Yes Historical Provider, MD   turmeric 500 MG CAPS Take 1 capsule by mouth   Yes Historical Provider, MD   zinc gluconate 50 MG tablet Take 50 mg by mouth daily   Yes Historical Provider, MD   ibuprofen (ADVIL;MOTRIN) 200 MG tablet Take 200 mg by mouth every 6 hours as needed for Pain   Yes Historical Provider, MD   Ascorbic Acid (VITAMIN C) 250 MG tablet Take 250 mg by mouth daily   Yes Historical Provider, MD   vitamin D (CHOLECALCIFEROL) 1000 UNIT TABS tablet Take 1,000 Units by mouth daily   Yes Historical Provider, MD   MAGNESIUM PO Take by mouth daily   Yes Historical Provider, MD   calcium-vitamin D (OSCAL-500) 500-200 MG-UNIT per tablet Take 1 tablet by mouth 2 times daily 5/16/18  Yes Paul Franco PA-C   Loratadine (CLARITIN) 10 MG CAPS Take  by mouth daily. Yes Historical Provider, MD   hydrocortisone (ANUSOL-HC) 25 MG suppository Place 1 suppository rectally every 12 hours  Patient not taking: Reported on 10/5/2021 3/17/17   Kelly Mario MD        Allergies:       Barley grass, Food, and Milk-related compounds    Social History:     Tobacco:    reports that she has never smoked. She has never used smokeless tobacco.  Alcohol:      reports no history of alcohol use. Drug Use:  reports no history of drug use.     Family History:     Family History   Problem Relation Age of Onset    Stroke Mother     Cancer Father         bladder Review of Systems:     Positive and Negative as described in HPI    Review of Systems   Skin:        Admits lesion on left thumb       Physical Exam:   Vitals:  /80   Pulse 72   Temp 97 °F (36.1 °C)   Resp 16   Ht 5' 1\" (1.549 m)   Wt 140 lb (63.5 kg)   SpO2 97%   BMI 26.45 kg/m²     Physical Exam  Constitutional:       General: She is not in acute distress. Appearance: Normal appearance. She is normal weight. She is not ill-appearing or toxic-appearing. HENT:      Head: Normocephalic. Cardiovascular:      Rate and Rhythm: Normal rate and regular rhythm. Heart sounds: Murmur (right sternal border, systolic) heard. Pulmonary:      Effort: Pulmonary effort is normal. No respiratory distress. Breath sounds: Normal breath sounds. No stridor. No wheezing, rhonchi or rales. Skin:     Comments: Single, firm, round, 1/2 cm diameter nodule on left thumb MCP joint. No overlying warmth, erythema, or swelling. Nontender. Neurological:      Mental Status: She is alert. Psychiatric:         Mood and Affect: Mood normal.         Behavior: Behavior normal.         Thought Content:  Thought content normal.         Judgment: Judgment normal.         Data:     Lab Results   Component Value Date     12/02/2021    K 4.4 12/02/2021     12/02/2021    CO2 23 12/02/2021    BUN 14 12/02/2021    CREATININE 0.45 12/02/2021    GLUCOSE 112 12/02/2021    PROT 7.8 09/01/2018    LABALBU 4.0 11/07/2019    BILITOT 0.7 11/07/2019    ALKPHOS 73 11/07/2019    AST 19 10/02/2020    ALT 14 10/02/2020     Lab Results   Component Value Date    WBC 9.5 12/02/2021    RBC 4.49 12/02/2021    HGB 13.3 12/02/2021    HCT 40.8 12/02/2021    MCV 90.9 12/02/2021    MCH 29.6 12/02/2021    MCHC 32.6 12/02/2021    RDW 13.1 12/02/2021    PLT See Reflexed IPF Result 12/02/2021    MPV NOT REPORTED 12/02/2021     Lab Results   Component Value Date    TSH 2.01 02/29/2016     Lab Results   Component Value Date CHOL 204 10/02/2020    HDL 60 10/02/2020       Assessment/Plan:      Diagnosis Orders   1. Ganglion cyst       Physical exam findings suggestive of ganglion cyst.  At this time, we will continue observation. No intervention at this time. Supplied written education regarding ganglion cysts. Patient to notify office if lesion grows in size or becomes painful. We also reviewed signs and symptoms of infection and when to seek immediate care. I do not suspect infection today. If lesion continues to grow in size or become bothersome, will refer to orthopedics    Completed Refills   Requested Prescriptions      No prescriptions requested or ordered in this encounter       No orders of the defined types were placed in this encounter. No results found for this visit on 12/06/21. Return if symptoms worsen or fail to improve.     Electronically signed by TAYLOR Jimenez CNP on 12/06/21 at 4:37 PM.

## 2021-12-22 ENCOUNTER — TELEPHONE (OUTPATIENT)
Dept: FAMILY MEDICINE CLINIC | Age: 86
End: 2021-12-22

## 2021-12-22 DIAGNOSIS — M54.9 UPPER BACK PAIN: ICD-10-CM

## 2021-12-22 DIAGNOSIS — M54.2 NECK PAIN: Primary | ICD-10-CM

## 2021-12-22 NOTE — TELEPHONE ENCOUNTER
Pt. Called and left a voicemail requesting a referral to [facility] for treatment of upper back and neck pain. Please advise.

## 2022-05-20 ENCOUNTER — OFFICE VISIT (OUTPATIENT)
Dept: FAMILY MEDICINE CLINIC | Age: 87
End: 2022-05-20
Payer: MEDICARE

## 2022-05-20 VITALS
HEIGHT: 64 IN | SYSTOLIC BLOOD PRESSURE: 148 MMHG | DIASTOLIC BLOOD PRESSURE: 76 MMHG | WEIGHT: 140 LBS | BODY MASS INDEX: 23.9 KG/M2 | OXYGEN SATURATION: 96 %

## 2022-05-20 DIAGNOSIS — M40.04 POSTURAL KYPHOSIS OF THORACIC REGION: ICD-10-CM

## 2022-05-20 DIAGNOSIS — I50.32 CHRONIC DIASTOLIC CHF (CONGESTIVE HEART FAILURE) (HCC): ICD-10-CM

## 2022-05-20 DIAGNOSIS — Z00.00 ENCOUNTER FOR ANNUAL WELLNESS EXAM IN MEDICARE PATIENT: Primary | ICD-10-CM

## 2022-05-20 DIAGNOSIS — M81.0 OSTEOPOROSIS, UNSPECIFIED OSTEOPOROSIS TYPE, UNSPECIFIED PATHOLOGICAL FRACTURE PRESENCE: ICD-10-CM

## 2022-05-20 DIAGNOSIS — C50.919 BREAST CANCER STAGE T1B, GREATER THAN 0.5 CM AND LESS THAN OR EQUAL TO 1 CM IN GREATEST DIMENSION (HCC): ICD-10-CM

## 2022-05-20 PROCEDURE — 1090F PRES/ABSN URINE INCON ASSESS: CPT | Performed by: FAMILY MEDICINE

## 2022-05-20 PROCEDURE — 99213 OFFICE O/P EST LOW 20 MIN: CPT | Performed by: FAMILY MEDICINE

## 2022-05-20 PROCEDURE — 4040F PNEUMOC VAC/ADMIN/RCVD: CPT | Performed by: FAMILY MEDICINE

## 2022-05-20 PROCEDURE — G8427 DOCREV CUR MEDS BY ELIG CLIN: HCPCS | Performed by: FAMILY MEDICINE

## 2022-05-20 PROCEDURE — G8420 CALC BMI NORM PARAMETERS: HCPCS | Performed by: FAMILY MEDICINE

## 2022-05-20 PROCEDURE — G0439 PPPS, SUBSEQ VISIT: HCPCS | Performed by: FAMILY MEDICINE

## 2022-05-20 PROCEDURE — 1036F TOBACCO NON-USER: CPT | Performed by: FAMILY MEDICINE

## 2022-05-20 PROCEDURE — 1123F ACP DISCUSS/DSCN MKR DOCD: CPT | Performed by: FAMILY MEDICINE

## 2022-05-20 NOTE — PROGRESS NOTES
SACHIN Velázquez, am scribing for and in the presence of Dr. Rush Santizo. 5/20/22/11:00/CRF      [address] [address] [address]  Dept: [phone]    HPI:  Pt present for eye exam for auto insurance   - questions about her neck    Current Outpatient Medications   Medication Sig Dispense Refill    Teriparatide, Recombinant, (FORTEO) 600 MCG/2.4ML SOPN injection Inject 0.08 mLs into the skin daily 2.24 mL 3    Methylsulfonylmethane 1000 MG TABS Take by mouth      [place] 150 MG CAPS Take 1 capsule by mouth      polyethylene glycol (GLYCOLAX) 17 GM/SCOOP powder Take by mouth      turmeric 500 MG CAPS Take 1 capsule by mouth      zinc gluconate 50 MG tablet Take 50 mg by mouth daily      Ascorbic Acid (VITAMIN C) 250 MG tablet Take 250 mg by mouth daily      vitamin D (CHOLECALCIFEROL) 1000 UNIT TABS tablet Take 1,000 Units by mouth daily      MAGNESIUM PO Take by mouth daily      calcium-vitamin D (OSCAL-500) 500-200 MG-UNIT per tablet Take 1 tablet by mouth 2 times daily 30 tablet 3    hydrocortisone (ANUSOL-HC) 25 MG suppository Place 1 suppository rectally every 12 hours (Patient not taking: Reported on 10/5/2021) 6 suppository 0    Loratadine (CLARITIN) 10 MG CAPS Take  by mouth daily. (Patient not taking: Reported on 5/20/2022)       No current facility-administered medications for this visit. ROS:  Admits neck strain - causes her to feel choked     EXAM:  PHYSICAL EXAM:  General Appearance: in no acute distress, well developed, well nourished. Eyes: pupils equal, round reactive to light and accommodation. Ears: normal canal and TM's. Nose: nares patent, no lesions. Oral Cavity: mucosa moist.  Throat: clear. Neck/Thyroid: neck supple, full range of motion, no cervical lymphadenopathy, no thyromegaly or carotid bruits. Skin: warm and dry. No suspicious lesions. Heart: regular rate and rhythm. No murmurs.  S1, S2 normal, no gallops. Reg 70  Lungs: clear to auscultation bilaterally. Abdomen: bowel sounds present, soft, nontender, nondistended, no masses or organomegaly. Musculoskeletal: normal, full range of motion in knees and hips, no swelling or tenderness. Marked dorsal kyphosis, unsteady sitting up, hyperextension of neck and lumbar spine is extended  Extremities: no cyanosis or edema. Peripheral Pulses: 2+ throughout, symetric. Neurologic: nonfocal, motor strength normal upper and lower extremities, sensory exam intact. Psych: normal affect, speech fluent. BP (!) 148/76   Ht 5' 4\" (1.626 m)   Wt 140 lb (63.5 kg)   SpO2 96%   BMI 24.03 kg/m²   Wt Readings from Last 3 Encounters:   05/20/22 140 lb (63.5 kg)   12/06/21 140 lb (63.5 kg)   12/02/21 135 lb (61.2 kg)     BP Readings from Last 3 Encounters:   05/20/22 (!) 148/76   12/06/21 130/80   12/02/21 (!) 152/93         ASSESSMENT:   Diagnosis Orders   1. Osteoporosis, unspecified osteoporosis type, unspecified pathological fracture presence     2. Breast cancer stage T1b, greater than 0.5 cm and less than or equal to 1 cm in greatest dimension (HCC)     3. Chronic diastolic CHF (congestive heart failure) ([facility] [address]     4. Postural kyphosis of thoracic region           PLAN:  She is in need for her auto insurance form filled out for an assessment. We go through the form and she has no history of cardiac or respiratory trouble. She passes a eye exam with no trouble and no correction. She is on no prescribed medications, only supplements. We discuss her concern for her upper back and neck discomfort and trouble keeping her head up. She mentions a tumble last fall/winter 2021 and has struggled more since. We review how the curvature of her spine is going to effect her and down the line be hunched over to the point of not being able to look up enough to see the road. I encourage her to start injections for her osteoporosis which is very severe.  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 She can operate a vehicle safely at this time. I will sign the insurance form and clear her to drive. Return in 3 months to see how the injections are working for her. No orders of the defined types were placed in this encounter. Orders Placed This Encounter   Medications    Teriparatide, Recombinant, (FORTEO) 600 MCG/2.4ML SOPN injection     Sig: Inject 0.08 mLs into the skin daily     Dispense:  2.24 mL     Refill:  3   I, Dr. Gladys Mendoza, personally performed the services described in this documentation as scribed/transcribed by MAYRA Bhakta Arm in my presence, and is both accurate and complete.

## 2022-05-23 ENCOUNTER — TELEPHONE (OUTPATIENT)
Dept: FAMILY MEDICINE CLINIC | Age: 87
End: 2022-05-23

## 2022-05-23 DIAGNOSIS — Z01.812 PRE-PROCEDURE LAB EXAM: Primary | ICD-10-CM

## 2022-05-23 RX ORDER — ZOLEDRONIC ACID 5 MG/100ML
5 INJECTION, SOLUTION INTRAVENOUS ONCE
Qty: 100 ML | Refills: 0 | Status: SHIPPED | OUTPATIENT
Start: 2022-05-23 | End: 2022-07-12 | Stop reason: ALTCHOICE

## 2022-05-23 NOTE — TELEPHONE ENCOUNTER
Pt calls in and says she has changed her mind and would like an order for Reclast and not do the forteo injections     Also you said return in august but she is not due until October for [facility] can she wait till October ?

## 2022-06-01 ENCOUNTER — HOSPITAL ENCOUNTER (OUTPATIENT)
Age: 87
Discharge: HOME | End: 2022-06-01
Payer: MEDICARE

## 2022-06-01 DIAGNOSIS — Z01.812 PRE-PROCEDURE LAB EXAM: ICD-10-CM

## 2022-06-01 LAB
CALCIUM SERPL-MCNC: 9.6 MG/DL (ref 8.6–10.4)
CREAT SERPL-MCNC: 0.56 MG/DL (ref 0.5–0.9)
GFR AFRICAN AMERICAN: >60 ML/MIN
GFR NON-AFRICAN AMERICAN: >60 ML/MIN
GFR SERPL CREATININE-BSD FRML MDRD: NORMAL ML/MIN/{1.73_M2}
GFR SERPL CREATININE-BSD FRML MDRD: NORMAL ML/MIN/{1.73_M2}

## 2022-06-01 PROCEDURE — 82565 ASSAY OF CREATININE: CPT

## 2022-06-01 PROCEDURE — 82310 ASSAY OF CALCIUM: CPT

## 2022-06-01 PROCEDURE — 36415 COLL VENOUS BLD VENIPUNCTURE: CPT

## 2022-06-07 RX ORDER — ONDANSETRON 2 MG/ML
8 INJECTION INTRAMUSCULAR; INTRAVENOUS
Status: CANCELLED | OUTPATIENT
Start: 2022-06-07

## 2022-06-07 RX ORDER — ALBUTEROL SULFATE 90 UG/1
4 AEROSOL, METERED RESPIRATORY (INHALATION) PRN
Status: CANCELLED | OUTPATIENT
Start: 2022-06-07

## 2022-06-07 RX ORDER — SODIUM CHLORIDE 9 MG/ML
5-250 INJECTION, SOLUTION INTRAVENOUS PRN
Status: CANCELLED | OUTPATIENT
Start: 2022-06-07

## 2022-06-07 RX ORDER — ACETAMINOPHEN 325 MG/1
650 TABLET ORAL
Status: CANCELLED | OUTPATIENT
Start: 2022-06-07

## 2022-06-07 RX ORDER — DIPHENHYDRAMINE HYDROCHLORIDE 50 MG/ML
50 INJECTION INTRAMUSCULAR; INTRAVENOUS
Status: CANCELLED | OUTPATIENT
Start: 2022-06-07

## 2022-06-07 RX ORDER — SODIUM CHLORIDE 0.9 % (FLUSH) 0.9 %
5-40 SYRINGE (ML) INJECTION PRN
Status: CANCELLED | OUTPATIENT
Start: 2022-06-07

## 2022-06-07 RX ORDER — SODIUM CHLORIDE 9 MG/ML
INJECTION, SOLUTION INTRAVENOUS ONCE
Status: CANCELLED | OUTPATIENT
Start: 2022-06-07 | End: 2022-06-07

## 2022-06-07 RX ORDER — EPINEPHRINE 1 MG/ML
0.3 INJECTION, SOLUTION, CONCENTRATE INTRAVENOUS PRN
Status: CANCELLED | OUTPATIENT
Start: 2022-06-07

## 2022-06-07 RX ORDER — SODIUM CHLORIDE 9 MG/ML
INJECTION, SOLUTION INTRAVENOUS CONTINUOUS
Status: CANCELLED | OUTPATIENT
Start: 2022-06-07

## 2022-06-07 RX ORDER — ZOLEDRONIC ACID 5 MG/100ML
5 INJECTION, SOLUTION INTRAVENOUS ONCE
Status: CANCELLED | OUTPATIENT
Start: 2022-06-07 | End: 2022-06-07

## 2022-06-16 ENCOUNTER — HOSPITAL ENCOUNTER (OUTPATIENT)
Dept: INFUSION THERAPY | Age: 87
Discharge: HOME | End: 2022-06-16
Payer: MEDICARE

## 2022-06-16 VITALS
SYSTOLIC BLOOD PRESSURE: 125 MMHG | HEART RATE: 70 BPM | DIASTOLIC BLOOD PRESSURE: 75 MMHG | RESPIRATION RATE: 18 BRPM | TEMPERATURE: 97.8 F

## 2022-06-16 DIAGNOSIS — M81.0 OSTEOPOROSIS, UNSPECIFIED OSTEOPOROSIS TYPE, UNSPECIFIED PATHOLOGICAL FRACTURE PRESENCE: Primary | ICD-10-CM

## 2022-06-16 PROCEDURE — 2580000003 HC RX 258: Performed by: FAMILY MEDICINE

## 2022-06-16 PROCEDURE — 6360000002 HC RX W HCPCS: Performed by: FAMILY MEDICINE

## 2022-06-16 PROCEDURE — 96365 THER/PROPH/DIAG IV INF INIT: CPT

## 2022-06-16 RX ORDER — EPINEPHRINE 1 MG/ML
0.3 INJECTION, SOLUTION, CONCENTRATE INTRAVENOUS PRN
OUTPATIENT
Start: 2022-06-16

## 2022-06-16 RX ORDER — SODIUM CHLORIDE 9 MG/ML
INJECTION, SOLUTION INTRAVENOUS CONTINUOUS
OUTPATIENT
Start: 2022-06-16

## 2022-06-16 RX ORDER — ZOLEDRONIC ACID 5 MG/100ML
5 INJECTION, SOLUTION INTRAVENOUS ONCE
Status: CANCELLED | OUTPATIENT
Start: 2022-06-16 | End: 2022-06-16

## 2022-06-16 RX ORDER — SODIUM CHLORIDE 0.9 % (FLUSH) 0.9 %
5-40 SYRINGE (ML) INJECTION PRN
Status: DISCONTINUED | OUTPATIENT
Start: 2022-06-16 | End: 2022-06-17 | Stop reason: HOSPADM

## 2022-06-16 RX ORDER — SODIUM CHLORIDE 9 MG/ML
5-250 INJECTION, SOLUTION INTRAVENOUS PRN
OUTPATIENT
Start: 2022-06-16

## 2022-06-16 RX ORDER — ONDANSETRON 2 MG/ML
8 INJECTION INTRAMUSCULAR; INTRAVENOUS
OUTPATIENT
Start: 2022-06-16

## 2022-06-16 RX ORDER — SODIUM CHLORIDE 9 MG/ML
INJECTION, SOLUTION INTRAVENOUS ONCE
Status: COMPLETED | OUTPATIENT
Start: 2022-06-16 | End: 2022-06-16

## 2022-06-16 RX ORDER — ACETAMINOPHEN 325 MG/1
650 TABLET ORAL
OUTPATIENT
Start: 2022-06-16

## 2022-06-16 RX ORDER — SODIUM CHLORIDE 0.9 % (FLUSH) 0.9 %
5-40 SYRINGE (ML) INJECTION PRN
OUTPATIENT
Start: 2022-06-16

## 2022-06-16 RX ORDER — ALBUTEROL SULFATE 90 UG/1
4 AEROSOL, METERED RESPIRATORY (INHALATION) PRN
OUTPATIENT
Start: 2022-06-16

## 2022-06-16 RX ORDER — SODIUM CHLORIDE 9 MG/ML
INJECTION, SOLUTION INTRAVENOUS ONCE
Status: CANCELLED | OUTPATIENT
Start: 2022-06-16 | End: 2022-06-16

## 2022-06-16 RX ORDER — ZOLEDRONIC ACID 5 MG/100ML
5 INJECTION, SOLUTION INTRAVENOUS ONCE
Status: COMPLETED | OUTPATIENT
Start: 2022-06-16 | End: 2022-06-16

## 2022-06-16 RX ORDER — DIPHENHYDRAMINE HYDROCHLORIDE 50 MG/ML
50 INJECTION INTRAMUSCULAR; INTRAVENOUS
OUTPATIENT
Start: 2022-06-16

## 2022-06-16 RX ADMIN — SODIUM CHLORIDE: 9 INJECTION, SOLUTION INTRAVENOUS at 13:29

## 2022-06-16 RX ADMIN — ZOLEDRONIC ACID 5 MG: 5 INJECTION, SOLUTION INTRAVENOUS at 13:30

## 2022-06-16 RX ADMIN — SODIUM CHLORIDE, PRESERVATIVE FREE 10 ML: 5 INJECTION INTRAVENOUS at 13:23

## 2022-06-16 NOTE — PROGRESS NOTES
1353-Pt. Tolerating infusion without signs or symptoms of reaction. Nydia Restrepo CMA       Chart routed to no one .

## 2022-07-03 ENCOUNTER — HOSPITAL ENCOUNTER (EMERGENCY)
Age: 87
Discharge: HOME | End: 2022-07-03
Payer: MEDICARE

## 2022-07-03 VITALS
TEMPERATURE: 98.2 F | RESPIRATION RATE: 16 BRPM | HEART RATE: 72 BPM | OXYGEN SATURATION: 97 % | DIASTOLIC BLOOD PRESSURE: 90 MMHG | SYSTOLIC BLOOD PRESSURE: 168 MMHG

## 2022-07-03 DIAGNOSIS — T50.905A MEDICATION REACTION, INITIAL ENCOUNTER: Primary | ICD-10-CM

## 2022-07-03 DIAGNOSIS — M81.8 OTHER OSTEOPOROSIS WITHOUT CURRENT PATHOLOGICAL FRACTURE: Chronic | ICD-10-CM

## 2022-07-03 PROCEDURE — 99283 EMERGENCY DEPT VISIT LOW MDM: CPT

## 2022-07-03 PROCEDURE — 6370000000 HC RX 637 (ALT 250 FOR IP): Performed by: NURSE PRACTITIONER

## 2022-07-03 RX ORDER — HYDROCODONE BITARTRATE AND ACETAMINOPHEN 5; 325 MG/1; MG/1
1 TABLET ORAL ONCE
Status: COMPLETED | OUTPATIENT
Start: 2022-07-03 | End: 2022-07-03

## 2022-07-03 RX ORDER — HYDROCODONE BITARTRATE AND ACETAMINOPHEN 5; 325 MG/1; MG/1
1 TABLET ORAL EVERY 6 HOURS PRN
Qty: 12 TABLET | Refills: 0 | Status: SHIPPED | OUTPATIENT
Start: 2022-07-03 | End: 2022-07-06

## 2022-07-03 RX ADMIN — HYDROCODONE BITARTRATE AND ACETAMINOPHEN 1 TABLET: 5; 325 TABLET ORAL at 18:09

## 2022-07-03 ASSESSMENT — PAIN DESCRIPTION - ORIENTATION: ORIENTATION: RIGHT

## 2022-07-03 ASSESSMENT — PAIN SCALES - GENERAL
PAINLEVEL_OUTOF10: 8
PAINLEVEL_OUTOF10: 5

## 2022-07-03 ASSESSMENT — ENCOUNTER SYMPTOMS
RESPIRATORY NEGATIVE: 1
GASTROINTESTINAL NEGATIVE: 1

## 2022-07-03 ASSESSMENT — PAIN DESCRIPTION - FREQUENCY: FREQUENCY: INTERMITTENT

## 2022-07-03 ASSESSMENT — PAIN DESCRIPTION - DESCRIPTORS: DESCRIPTORS: SORE

## 2022-07-03 ASSESSMENT — PAIN - FUNCTIONAL ASSESSMENT: PAIN_FUNCTIONAL_ASSESSMENT: 0-10

## 2022-07-03 ASSESSMENT — PAIN DESCRIPTION - LOCATION: LOCATION: LEG

## 2022-07-03 NOTE — ED PROVIDER NOTES
[facility] ED  EMERGENCY DEPARTMENT ENCOUNTER      Pt Name: Lui Noble  MRN: [id number]  [place] 11/12/1932  Date of evaluation: 7/3/2022  Provider: TAYLOR Pena [id number]       Chief Complaint   Patient presents with    Leg Pain     right leg pain on going for one week, states she had injection 6/16 for body density         HISTORY OF PRESENT ILLNESS   (Location/Symptom, Timing/Onset, Context/Setting, Quality, Duration, Modifying Factors, Severity)  Note limiting factors. Lui Noble is a 80 y.o. female who presents to the emergency department complaining of worsening joint and bone pain after her Reclast infusion a week and a half ago. She states she has been taking ibuprofen every 4 hours but the pain just keeps getting worse. Nursing Notes were reviewed. REVIEW OF SYSTEMS    (2-9 systems for level 4, 10 or more for level 5)     Review of Systems   Constitutional: Negative. HENT: Negative. Respiratory: Negative. Cardiovascular: Negative. Gastrointestinal: Negative. Genitourinary: Negative. Musculoskeletal: Positive for arthralgias. Skin: Negative. Neurological: Negative. Except as noted above the remainder of the review of systems was reviewed and negative. PAST MEDICAL HISTORY     Past Medical History:   Diagnosis Date    Asthma     Ductal carcinoma ([facility] [address] 10/2019    Stage 1B, mastectomy, [place] Lymph node Bx 11/19. was on tamoxifen for short while    Eczema     History of femur fracture 11/2014    left, hairline    Hyperlipidemia     Hypertension     Menopausal state 1948    ovarian cyst    Osteoporosis 2009    Sinus disorder          SURGICAL HISTORY       Past Surgical History:   Procedure Laterality Date    COLONOSCOPY  11/30/2016    -hemorrhoids    CYST REMOVAL      scalp, sebaceous, head/neck    LAPAROSCOPIC APPENDECTOMY  09/01/2018    with Dr. Maite Cedeno.     NE LAP,APPENDECTOMY N/A 9/1/2018    APPENDECTOMY LAPAROSCOPIC performed by Estefanía Bond MD at [address] FX+INTRAMED SHREYAS Left 5/13/2018    FEMUR IM NAIL SHREYAS INSERTION performed by Anil Tompkins MD at [address] [facility]       Discharge Medication List as of 7/3/2022  5:58 PM      CONTINUE these medications which have NOT CHANGED    Details   zoledronic acid (RECLAST) 5 MG/100ML SOLN Infuse 100 mLs intravenously once for 1 dose, Disp-100 mL, R-0Print      Teriparatide, Recombinant, (FORTEO) 600 MCG/2.4ML SOPN injection Inject 0.08 mLs into the skin daily, Disp-2.24 mL, R-3Normal      Methylsulfonylmethane 1000 MG TABS Take by mouthHistorical Med      [place] 150 MG CAPS Take 1 capsule by mouthHistorical Med      polyethylene glycol (GLYCOLAX) 17 GM/SCOOP powder Take by mouthHistorical Med      turmeric 500 MG CAPS Take 1 capsule by mouthHistorical Med      zinc gluconate 50 MG tablet Take 50 mg by mouth dailyHistorical Med      Ascorbic Acid (VITAMIN C) 250 MG tablet Take 250 mg by mouth dailyHistorical Med      vitamin D (CHOLECALCIFEROL) 1000 UNIT TABS tablet Take 1,000 Units by mouth dailyHistorical Med      MAGNESIUM PO Take by mouth dailyHistorical Med      calcium-vitamin D (OSCAL-500) 500-200 MG-UNIT per tablet Take 1 tablet by mouth 2 times daily, Disp-30 tablet, R-3DC to SNF      hydrocortisone (ANUSOL-HC) 25 MG suppository Place 1 suppository rectally every 12 hours, Disp-6 suppository, R-0Normal      Loratadine (CLARITIN) 10 MG CAPS Take  by mouth daily.              ALLERGIES     Barley grass, Food, and Milk-related compounds    FAMILY HISTORY       Family History   Problem Relation Age of Onset    Stroke Mother     Cancer Father         bladder          SOCIAL HISTORY       Social History     Socioeconomic History    Marital status: Single     Spouse name: None    Number of children: None    Years of education: None    Highest education level: None   Occupational History    None Tobacco Use    Smoking status: Never Smoker    Smokeless tobacco: Never Used   Substance and Sexual Activity    Alcohol use: No    Drug use: No    Sexual activity: Not Currently   Other Topics Concern    None   Social History Narrative    None     Social Determinants of Health     Financial Resource Strain: Low Risk     Difficulty of Paying Living Expenses: Not hard at all   Food Insecurity: No Food Insecurity    Worried About Running Out of Food in the Last Year: Never true    Margarette of Food in the Last Year: Never true   Transportation Needs:     Lack of Transportation (Medical): Not on file    Lack of Transportation (Non-Medical):  Not on file   Physical Activity:     Days of Exercise per Week: Not on file    Minutes of Exercise per Session: Not on file   Stress:     Feeling of Stress : Not on file   Social Connections:     Frequency of Communication with Friends and Family: Not on file    Frequency of Social Gatherings with Friends and Family: Not on file    Attends Gnosticist Services: Not on file    Active Member of [address] [place] [address] or Organizations: Not on file    Attends Club or Organization Meetings: Not on file    Marital Status: Not on file   Intimate Partner Violence:     Fear of Current or Ex-Partner: Not on file    Emotionally Abused: Not on file    Physically Abused: Not on file    Sexually Abused: Not on file   Housing Stability:     Unable to Pay for Housing in the Last Year: Not on file    Number of [place] in the Last Year: Not on file    Unstable Housing in the Last Year: Not on file       SCREENINGS         [place] Coma Scale  Eye Opening: Spontaneous  Best Verbal Response: Oriented  Best Motor Response: Obeys commands  [place] Coma Scale Score: 15                     CIWA Assessment  BP: (!) 168/90  Heart Rate: 72                 PHYSICAL EXAM    (up to 7 for level 4, 8 or more for level 5)     ED Triage Vitals [07/03/22 1451]   BP Temp Temp src Heart Rate Resp SpO2 osteoporosis without current pathological fracture          DISPOSITION/PLAN   DISPOSITION Decision To Discharge 07/03/2022 05:56:16 PM      PATIENT REFERRED TO:  Ignacia Anand MD  [address] [place] [address]  [phone]    In 1 week        DISCHARGE MEDICATIONS:  Discharge Medication List as of 7/3/2022  5:58 PM      START taking these medications    Details   HYDROcodone-acetaminophen (NORCO) 5-325 MG per tablet Take 1 tablet by mouth every 6 hours as needed for Pain for up to 3 days. Intended supply: 3 days. Take lowest dose possible to manage pain, Disp-12 tablet, R-0Print           Controlled Substances Monitoring:     No flowsheet data found.     (Please note that portions of this note were completed with a voice recognition program.  Efforts were made to edit the dictations but occasionally words are mis-transcribed.)    TAYLOR Alaniz CNP (electronically signed)  Attending Emergency Physician         TAYLOR Alaniz CNP  07/03/22 1959 (4) excellent

## 2022-07-04 NOTE — ED PROVIDER NOTES
[facility] ED  Emergency Department  Faculty Attestation     Pt Name: Zeny Cuevas  MRN: [id number]  [place] 11/12/1932  Date of evaluation: 7/3/22    I was personally available for consultation in the Emergency Department. Have reviewed everything on the chart that is available and agree with the documentation provided by the advanced practice provider, including discussion about the assessment, treatment plan and disposition. Zeny Cuevas is a 80 y.o. female who presents with Leg Pain (right leg pain on going for one week, states she had injection 6/16 for body density)      Vitals:   Vitals:    07/03/22 1451   BP: (!) 168/90   Pulse: 72   Resp: 16   Temp: 98.2 °F (36.8 °C)   SpO2: 97%       Impression:   1. Medication reaction, initial encounter    2. Other osteoporosis without current pathological fracture        Blood pressure more elevated today than on recent checks, patient noted to be in significant pain. On Reclast for osteoporosis. Musa Quach MD  Emergency  Physician    (Please note that portions of this note were completed with a voice recognition program.  Efforts were made to edit the dictations but occasionally words are mis-transcribed.)       Magnolia Villareal MD  07/03/22 3068

## 2022-07-10 ENCOUNTER — HOSPITAL ENCOUNTER (EMERGENCY)
Age: 87
Discharge: HOME | End: 2022-07-10
Payer: MEDICARE

## 2022-07-10 VITALS
SYSTOLIC BLOOD PRESSURE: 173 MMHG | HEART RATE: 73 BPM | TEMPERATURE: 98.4 F | RESPIRATION RATE: 20 BRPM | OXYGEN SATURATION: 97 % | DIASTOLIC BLOOD PRESSURE: 73 MMHG

## 2022-07-10 DIAGNOSIS — R25.2 MUSCLE CRAMP, NOCTURNAL: Primary | ICD-10-CM

## 2022-07-10 LAB
ABSOLUTE EOS #: 0.23 K/UL (ref 0–0.44)
ABSOLUTE IMMATURE GRANULOCYTE: 0.03 K/UL (ref 0–0.3)
ABSOLUTE LYMPH #: 1.86 K/UL (ref 1.1–3.7)
ABSOLUTE MONO #: 0.89 K/UL (ref 0.1–1.2)
ANION GAP SERPL CALCULATED.3IONS-SCNC: 12 MMOL/L (ref 9–17)
BASOPHILS # BLD: 1 % (ref 0–2)
BASOPHILS ABSOLUTE: 0.08 K/UL (ref 0–0.2)
BUN BLDV-MCNC: 18 MG/DL (ref 8–23)
BUN/CREAT BLD: 23 (ref 9–20)
CALCIUM SERPL-MCNC: 9.3 MG/DL (ref 8.6–10.4)
CHLORIDE BLD-SCNC: 103 MMOL/L (ref 98–107)
CO2: 23 MMOL/L (ref 20–31)
CREAT SERPL-MCNC: 0.77 MG/DL (ref 0.5–0.9)
EOSINOPHILS RELATIVE PERCENT: 2 % (ref 1–4)
GFR AFRICAN AMERICAN: >60 ML/MIN
GFR NON-AFRICAN AMERICAN: >60 ML/MIN
GFR SERPL CREATININE-BSD FRML MDRD: ABNORMAL ML/MIN/{1.73_M2}
GFR SERPL CREATININE-BSD FRML MDRD: ABNORMAL ML/MIN/{1.73_M2}
GLUCOSE BLD-MCNC: 89 MG/DL (ref 70–99)
HCT VFR BLD CALC: 34.8 % (ref 36.3–47.1)
HEMOGLOBIN: 11.3 G/DL (ref 11.9–15.1)
IMMATURE GRANULOCYTES: 0 %
LYMPHOCYTES # BLD: 19 % (ref 24–43)
MAGNESIUM: 2.3 MG/DL (ref 1.6–2.6)
MCH RBC QN AUTO: 29.4 PG (ref 25.2–33.5)
MCHC RBC AUTO-ENTMCNC: 32.5 G/DL (ref 28.4–34.8)
MCV RBC AUTO: 90.6 FL (ref 82.6–102.9)
MONOCYTES # BLD: 9 % (ref 3–12)
NRBC AUTOMATED: 0 PER 100 WBC
PDW BLD-RTO: 13.1 % (ref 11.8–14.4)
PLATELET # BLD: 240 K/UL (ref 138–453)
PMV BLD AUTO: 9 FL (ref 8.1–13.5)
POTASSIUM SERPL-SCNC: 4.2 MMOL/L (ref 3.7–5.3)
PRO-BNP: 345 PG/ML
RBC # BLD: 3.84 M/UL (ref 3.95–5.11)
SEG NEUTROPHILS: 69 % (ref 36–65)
SEGMENTED NEUTROPHILS ABSOLUTE COUNT: 6.81 K/UL (ref 1.5–8.1)
SODIUM BLD-SCNC: 138 MMOL/L (ref 135–144)
WBC # BLD: 9.9 K/UL (ref 3.5–11.3)

## 2022-07-10 PROCEDURE — 99283 EMERGENCY DEPT VISIT LOW MDM: CPT

## 2022-07-10 PROCEDURE — 83735 ASSAY OF MAGNESIUM: CPT

## 2022-07-10 PROCEDURE — 36415 COLL VENOUS BLD VENIPUNCTURE: CPT

## 2022-07-10 PROCEDURE — 80048 BASIC METABOLIC PNL TOTAL CA: CPT

## 2022-07-10 PROCEDURE — 85025 COMPLETE CBC W/AUTO DIFF WBC: CPT

## 2022-07-10 PROCEDURE — 83880 ASSAY OF NATRIURETIC PEPTIDE: CPT

## 2022-07-10 RX ORDER — MELOXICAM 7.5 MG/1
7.5 TABLET ORAL DAILY
Qty: 14 TABLET | Refills: 0 | Status: SHIPPED | OUTPATIENT
Start: 2022-07-10 | End: 2022-08-02

## 2022-07-10 ASSESSMENT — ENCOUNTER SYMPTOMS
RESPIRATORY NEGATIVE: 1
GASTROINTESTINAL NEGATIVE: 1

## 2022-07-10 ASSESSMENT — PAIN SCALES - GENERAL: PAINLEVEL_OUTOF10: 10

## 2022-07-10 ASSESSMENT — PAIN - FUNCTIONAL ASSESSMENT: PAIN_FUNCTIONAL_ASSESSMENT: 0-10

## 2022-07-10 NOTE — ED PROVIDER NOTES
[facility] ED  EMERGENCY DEPARTMENT ENCOUNTER      Pt Name: Hardik Lopez  MRN: [id number]  [place] 11/12/1932  Date of evaluation: 7/10/2022  Provider: Jamaal Espinosa MD    [address] [place] [address]       Chief Complaint   Patient presents with    Hip Pain     right side pain states since she had reclase infusion         HISTORY OF PRESENT ILLNESS   (Location/Symptom, Timing/Onset, Context/Setting, Quality, Duration, Modifying Factors, Severity)  Note limiting factors. Hardik Lopez is a 80 y.o. female who presents to the emergency department for the second time in a week complaining of leg cramping and pain. She was seen by myself last week and states that she had had her Reclast infusion on June 16. Afterwards she started developing mild and then eventually moderate to severe bone pain and muscle aches. Patient was treated conservatively with some pain medication and reassured that it would likely subside. However, patient states that it is just continuing to get worse. Now she is stating she is getting cramping of her legs at night keeping her awake and making her have to get up and walk to get it to subside. States that she had the Reclast infusion several years ago and never had any issues with it like she has this time. She is also c/o bilateral lower leg edema which she states she has never had before in the past.  No hx of CHF. Denies chest pain or SOB. Nursing Notes were reviewed. REVIEW OF SYSTEMS    (2-9 systems for level 4, 10 or more for level 5)     Review of Systems   Constitutional: Negative. HENT: Negative. Respiratory: Negative. Cardiovascular:  Positive for leg swelling. Gastrointestinal: Negative. Genitourinary: Negative. Musculoskeletal:  Positive for arthralgias and myalgias. Skin: Negative. Neurological: Negative. Except as noted above the remainder of the review of systems was reviewed and negative.        PAST MEDICAL HISTORY Past Medical History:   Diagnosis Date    Asthma     Ductal carcinoma ([facility] [address] 10/2019    Stage 1B, mastectomy, [place] Lymph node Bx 11/19. was on tamoxifen for short while    Eczema     History of femur fracture 11/2014    left, hairline    Hyperlipidemia     Hypertension     Menopausal state 1948    ovarian cyst    Osteoporosis 2009    Sinus disorder          SURGICAL HISTORY       Past Surgical History:   Procedure Laterality Date    COLONOSCOPY  11/30/2016    -hemorrhoids    CYST REMOVAL      scalp, sebaceous, head/neck    LAPAROSCOPIC APPENDECTOMY  09/01/2018    with Dr. Anastasiia Best.     PA LAP,APPENDECTOMY N/A 9/1/2018    APPENDECTOMY LAPAROSCOPIC performed by Celeste Martinez MD at [address] [place] [address]  FX+INTRAMED SHREYAS Left 5/13/2018    FEMUR IM NAIL SHREYAS INSERTION performed by Hilda Shen MD at [address] [place] [address]       Discharge Medication List as of 7/10/2022  3:13 PM        CONTINUE these medications which have NOT CHANGED    Details   zoledronic acid (RECLAST) 5 MG/100ML SOLN Infuse 100 mLs intravenously once for 1 dose, Disp-100 mL, R-0Print      Teriparatide, Recombinant, (FORTEO) 600 MCG/2.4ML SOPN injection Inject 0.08 mLs into the skin daily, Disp-2.24 mL, R-3Normal      Methylsulfonylmethane 1000 MG TABS Take by mouthHistorical Med      [place] 150 MG CAPS Take 1 capsule by mouthHistorical Med      polyethylene glycol (GLYCOLAX) 17 GM/SCOOP powder Take by mouthHistorical Med      turmeric 500 MG CAPS Take 1 capsule by mouthHistorical Med      zinc gluconate 50 MG tablet Take 50 mg by mouth dailyHistorical Med      Ascorbic Acid (VITAMIN C) 250 MG tablet Take 250 mg by mouth dailyHistorical Med      vitamin D (CHOLECALCIFEROL) 1000 UNIT TABS tablet Take 1,000 Units by mouth dailyHistorical Med      MAGNESIUM PO Take by mouth dailyHistorical Med      calcium-vitamin D (OSCAL-500) 500-200 MG-UNIT per tablet Take 1 tablet by mouth 2 times daily, Disp-30 tablet, R-3DC to SNF hydrocortisone (ANUSOL-HC) 25 MG suppository Place 1 suppository rectally every 12 hours, Disp-6 suppository, R-0Normal      Loratadine (CLARITIN) 10 MG CAPS Take  by mouth daily. ALLERGIES     Barley grass, Food, and Milk-related compounds    FAMILY HISTORY       Family History   Problem Relation Age of Onset    Stroke Mother     Cancer Father         bladder          SOCIAL HISTORY       Social History     Socioeconomic History    Marital status: Single     Spouse name: None    Number of children: None    Years of education: None    Highest education level: None   Tobacco Use    Smoking status: Never    Smokeless tobacco: Never   Substance and Sexual Activity    Alcohol use: No    Drug use: No    Sexual activity: Not Currently     Social Determinants of Health     Financial Resource Strain: Low Risk     Difficulty of Paying Living Expenses: Not hard at all   Food Insecurity: No Food Insecurity    Worried About Running Out of Food in the Last Year: Never true    Ran Out of Food in the Last Year: Never true       SCREENINGS         [place] Coma Scale  Eye Opening: Spontaneous  Best Verbal Response: Oriented  Best Motor Response: Obeys commands  [place] Coma Scale Score: 15                     CIWA Assessment  BP: (!) 173/73  Heart Rate: 73                 PHYSICAL EXAM    (up to 7 for level 4, 8 or more for level 5)     ED Triage Vitals [07/10/22 1417]   BP Temp Temp Source Heart Rate Resp SpO2 Height Weight   (!) 173/73 98.4 °F (36.9 °C) Tympanic 73 20 97 % -- --       Physical Exam  Vitals and nursing note reviewed. Constitutional:       Appearance: Normal appearance. She is normal weight. HENT:      Head: Normocephalic and atraumatic. Mouth/Throat:      Mouth: Mucous membranes are moist.      Pharynx: Oropharynx is clear. Eyes:      Extraocular Movements: Extraocular movements intact.       Conjunctiva/sclera: Conjunctivae normal.      Pupils: Pupils are equal, round, and reactive to light. Cardiovascular:      Rate and Rhythm: Normal rate and regular rhythm. Pulses: Normal pulses. Heart sounds: Normal heart sounds. Pulmonary:      Effort: Pulmonary effort is normal.      Breath sounds: Normal breath sounds. Abdominal:      General: Bowel sounds are normal.      Palpations: Abdomen is soft. Musculoskeletal:         General: Normal range of motion. Cervical back: Normal range of motion and neck supple. Skin:     General: Skin is warm and dry. Capillary Refill: Capillary refill takes less than 2 seconds. Neurological:      General: No focal deficit present. Mental Status: She is alert and oriented to person, place, and time. Mental status is at baseline. Psychiatric:         Mood and Affect: Mood normal.         Behavior: Behavior normal.         Thought Content:  Thought content normal.         Judgment: Judgment normal.       DIAGNOSTIC RESULTS         RADIOLOGY:   Non-plain film images such as CT, Ultrasound and MRI are read by the radiologist. Plain radiographic images are visualized and preliminarily interpreted by the emergency physician with the below findings:        Interpretation per the Radiologist below, if available at the time of this note:    No orders to display         ED BEDSIDE ULTRASOUND:   Performed by ED Physician - none    LABS:  Labs Reviewed   BASIC METABOLIC PANEL - Abnormal; Notable for the following components:       Result Value    Bun/Cre Ratio 23 (*)     All other components within normal limits   CBC WITH AUTO DIFFERENTIAL - Abnormal; Notable for the following components:    RBC 3.84 (*)     Hemoglobin 11.3 (*)     Hematocrit 34.8 (*)     Seg Neutrophils 69 (*)     Lymphocytes 19 (*)     All other components within normal limits   BRAIN NATRIURETIC PEPTIDE - Abnormal; Notable for the following components:    Pro- (*)     All other components within normal limits   MAGNESIUM       All other labs were within normal range or not returned as of this dictation. EMERGENCY DEPARTMENT COURSE and DIFFERENTIAL DIAGNOSIS/MDM:   Vitals:    Vitals:    07/10/22 1417   BP: (!) 173/73   Pulse: 73   Resp: 20   Temp: 98.4 °F (36.9 °C)   TempSrc: Tympanic   SpO2: 97%           MDM  Number of Diagnoses or Management Options  Muscle cramp, nocturnal  Diagnosis management comments: Patient presents with continued pain after Reclast infusion. I went ahead and ordered labs due to her reports of leg cramping to ensure she did not have any electrolyte abnormalities. Labs were unremarkable. Patient will be given 7 days of meloxicam and instructed that she needs to follow-up with her PCP for any remaining issues with this problem. Patient agreeable to plan and discharge. FINAL IMPRESSION      1. Muscle cramp, nocturnal          DISPOSITION/PLAN   DISPOSITION Decision To Discharge 07/10/2022 03:09:38 PM      PATIENT REFERRED TO:  Sneha Newberry MD  [address] [place] [address]  [phone]    In 1 day      DISCHARGE MEDICATIONS:  Discharge Medication List as of 7/10/2022  3:13 PM        START taking these medications    Details   meloxicam (MOBIC) 7.5 MG tablet Take 1 tablet by mouth daily for 14 days, Disp-14 tablet, R-0Normal           Controlled Substances Monitoring:     No flowsheet data found.     (Please note that portions of this note were completed with a voice recognition program.  Efforts were made to edit the dictations but occasionally words are mis-transcribed.)    Janette Acosta MD (electronically signed)  Attending Emergency Physician          TAYLOR Joshi CNP  07/10/22 0909       Janette Acosta MD  07/18/22 8224

## 2022-07-12 ENCOUNTER — OFFICE VISIT (OUTPATIENT)
Dept: PRIMARY CARE CLINIC | Age: 87
End: 2022-07-12
Payer: MEDICARE

## 2022-07-12 VITALS
SYSTOLIC BLOOD PRESSURE: 168 MMHG | DIASTOLIC BLOOD PRESSURE: 82 MMHG | TEMPERATURE: 98.2 F | HEART RATE: 74 BPM | HEIGHT: 64 IN | WEIGHT: 138.5 LBS | BODY MASS INDEX: 23.64 KG/M2 | RESPIRATION RATE: 18 BRPM | OXYGEN SATURATION: 97 %

## 2022-07-12 DIAGNOSIS — S39.012A STRAIN OF LUMBAR REGION, INITIAL ENCOUNTER: ICD-10-CM

## 2022-07-12 DIAGNOSIS — M54.41 ACUTE BILATERAL LOW BACK PAIN WITH RIGHT-SIDED SCIATICA: Primary | ICD-10-CM

## 2022-07-12 DIAGNOSIS — Z76.89 ESTABLISHING CARE WITH NEW DOCTOR, ENCOUNTER FOR: ICD-10-CM

## 2022-07-12 PROBLEM — M54.31 RIGHT SIDED SCIATICA: Status: ACTIVE | Noted: 2022-07-12

## 2022-07-12 PROCEDURE — G8420 CALC BMI NORM PARAMETERS: HCPCS | Performed by: NURSE PRACTITIONER

## 2022-07-12 PROCEDURE — 99213 OFFICE O/P EST LOW 20 MIN: CPT | Performed by: NURSE PRACTITIONER

## 2022-07-12 PROCEDURE — 1123F ACP DISCUSS/DSCN MKR DOCD: CPT | Performed by: NURSE PRACTITIONER

## 2022-07-12 PROCEDURE — G8427 DOCREV CUR MEDS BY ELIG CLIN: HCPCS | Performed by: NURSE PRACTITIONER

## 2022-07-12 PROCEDURE — 1036F TOBACCO NON-USER: CPT | Performed by: NURSE PRACTITIONER

## 2022-07-12 PROCEDURE — 1090F PRES/ABSN URINE INCON ASSESS: CPT | Performed by: NURSE PRACTITIONER

## 2022-07-12 RX ORDER — METHYLPREDNISOLONE 4 MG/1
TABLET ORAL
Qty: 1 KIT | Refills: 0 | Status: SHIPPED | OUTPATIENT
Start: 2022-07-12 | End: 2022-07-18

## 2022-07-12 RX ORDER — BACLOFEN 10 MG/1
10 TABLET ORAL 3 TIMES DAILY
Qty: 15 TABLET | Refills: 0 | Status: SHIPPED | OUTPATIENT
Start: 2022-07-12 | End: 2022-07-17

## 2022-07-12 ASSESSMENT — ENCOUNTER SYMPTOMS
DIARRHEA: 0
BOWEL INCONTINENCE: 0
VOMITING: 0
SHORTNESS OF BREATH: 0
COUGH: 0
WHEEZING: 0
ABDOMINAL PAIN: 0
BACK PAIN: 1
SORE THROAT: 0
CONSTIPATION: 0
RHINORRHEA: 0
NAUSEA: 0

## 2022-07-12 ASSESSMENT — PATIENT HEALTH QUESTIONNAIRE - PHQ9
1. LITTLE INTEREST OR PLEASURE IN DOING THINGS: 0
SUM OF ALL RESPONSES TO PHQ QUESTIONS 1-9: 0
2. FEELING DOWN, DEPRESSED OR HOPELESS: 0
SUM OF ALL RESPONSES TO PHQ QUESTIONS 1-9: 0
SUM OF ALL RESPONSES TO PHQ QUESTIONS 1-9: 0
SUM OF ALL RESPONSES TO PHQ9 QUESTIONS 1 & 2: 0
SUM OF ALL RESPONSES TO PHQ QUESTIONS 1-9: 0

## 2022-07-12 NOTE — PATIENT INSTRUCTIONS
SURVEY:    You may be receiving a survey from [employer] regarding your visit today. Please complete the survey to enable us to provide the highest quality of care to you and your family. If you cannot score us a very good on any question, please call the office to discuss how we could have made your experience a very good one. Thank you.

## 2022-07-12 NOTE — PROGRESS NOTES
mouth   Yes Historical Provider, MD   [place] 150 MG CAPS Take 1 capsule by mouth   Yes Historical Provider, MD   polyethylene glycol (GLYCOLAX) 17 GM/SCOOP powder Take by mouth   Yes Historical Provider, MD   turmeric 500 MG CAPS Take 1 capsule by mouth   Yes Historical Provider, MD   zinc gluconate 50 MG tablet Take 50 mg by mouth daily   Yes Historical Provider, MD   Ascorbic Acid (VITAMIN C) 250 MG tablet Take 250 mg by mouth daily   Yes Historical Provider, MD   vitamin D (CHOLECALCIFEROL) 1000 UNIT TABS tablet Take 1,000 Units by mouth daily   Yes Historical Provider, MD   MAGNESIUM PO Take by mouth daily   Yes Historical Provider, MD   calcium-vitamin D (OSCAL-500) 500-200 MG-UNIT per tablet Take 1 tablet by mouth 2 times daily 5/16/18  Yes Paul Franco PA-C        Allergies:       Barley grass, Food, and Milk-related compounds    Social History:     Tobacco:    reports that she has never smoked. She has never used smokeless tobacco.  Alcohol:      reports no history of alcohol use. Drug Use:  reports no history of drug use. Family History:     Family History   Problem Relation Age of Onset    Stroke Mother     Cancer Father         bladder       Review of Systems:     Positive and Negative as described in HPI    Review of Systems   Constitutional: Negative for chills, fatigue, fever and weight loss. HENT: Negative for congestion, rhinorrhea and sore throat. Eyes: Negative for visual disturbance. Respiratory: Negative for cough, shortness of breath and wheezing. Cardiovascular: Negative for chest pain and palpitations. Gastrointestinal: Negative for abdominal pain, bowel incontinence, constipation, diarrhea, nausea and vomiting. Genitourinary: Negative for bladder incontinence, difficulty urinating, dysuria and pelvic pain. Musculoskeletal: Positive for back pain and gait problem. Negative for neck pain and neck stiffness. Skin: Negative for rash.    Neurological: Positive for numbness. Negative for dizziness, tingling, syncope, weakness, light-headedness, headaches and paresthesias. Physical Exam:   Vitals:  BP (!) 168/82 (Site: Left Upper Arm, Position: Sitting, Cuff Size: Medium Adult)   Pulse 74   Temp 98.2 °F (36.8 °C)   Resp 18   Ht 5' 4\" (1.626 m)   Wt 138 lb 8 oz (62.8 kg)   SpO2 97%   BMI 23.77 kg/m²     Physical Exam  Vitals and nursing note reviewed. Constitutional:       General: She is not in acute distress. Appearance: Normal appearance. She is not ill-appearing. HENT:      Mouth/Throat:      Mouth: Mucous membranes are moist.   Eyes:      General: No scleral icterus. Conjunctiva/sclera: Conjunctivae normal.   Neck:      Vascular: No carotid bruit. Cardiovascular:      Rate and Rhythm: Normal rate and regular rhythm. Heart sounds: No murmur heard. Pulmonary:      Effort: Pulmonary effort is normal.      Breath sounds: Normal breath sounds. No wheezing. Abdominal:      General: Bowel sounds are normal. There is no distension. Palpations: Abdomen is soft. Tenderness: There is no abdominal tenderness. Musculoskeletal:      Cervical back: Normal range of motion and neck supple. Lumbar back: Spasms and tenderness present. No deformity. Decreased range of motion. Positive right straight leg raise test.   Skin:     General: Skin is warm and dry. Findings: No rash. Neurological:      Mental Status: She is alert and oriented to person, place, and time.    Psychiatric:         Mood and Affect: Mood normal.         Behavior: Behavior normal.         Data:     Lab Results   Component Value Date/Time     07/10/2022 02:38 PM    K 4.2 07/10/2022 02:38 PM     07/10/2022 02:38 PM    CO2 23 07/10/2022 02:38 PM    BUN 18 07/10/2022 02:38 PM    CREATININE 0.77 07/10/2022 02:38 PM    GLUCOSE 89 07/10/2022 02:38 PM    PROT 7.8 09/01/2018 11:17 AM    LABALBU 4.0 11/07/2019 12:00 AM    BILITOT 0.7 11/07/2019 12:00 AM ALKPHOS 73 11/07/2019 12:00 AM    AST 19 10/02/2020 02:52 PM    ALT 14 10/02/2020 02:52 PM     Lab Results   Component Value Date/Time    WBC 9.9 07/10/2022 02:38 PM    RBC 3.84 07/10/2022 02:38 PM    HGB 11.3 07/10/2022 02:38 PM    HCT 34.8 07/10/2022 02:38 PM    MCV 90.6 07/10/2022 02:38 PM    MCH 29.4 07/10/2022 02:38 PM    MCHC 32.5 07/10/2022 02:38 PM    RDW 13.1 07/10/2022 02:38 PM     07/10/2022 02:38 PM    MPV 9.0 07/10/2022 02:38 PM     Lab Results   Component Value Date/Time    TSH 2.01 02/29/2016 03:44 PM     Lab Results   Component Value Date/Time    CHOL 204 10/02/2020 02:52 PM    HDL 60 10/02/2020 02:52 PM       Assessment/Plan:      Diagnosis Orders   1. Acute bilateral low back pain with right-sided sciatica  methylPREDNISolone (MEDROL DOSEPACK) 4 MG tablet    baclofen (LIORESAL) 10 MG tablet   2. Strain of lumbar region, initial encounter     3. Establishing care with new doctor, encounter for       I do believe this is related to her Reclast injection. I believe she has sciatica from acute low back strain. I have asked her to start a Medrol Dosepak and use baclofen as needed for discomfort at night. She may continue Tylenol. I would discontinue Mobic at this time. She is set for a routine appointment next month with our office. 1.  Mandy received counseling on the following healthy behaviors: nutrition, exercise and medication adherence  2. Patient given educational materials - see patient instructions  3. Was a self-tracking handout given in paper form or via [employer]? No  If yes, see orders or list here. 4.  Discussed use, benefit, and side effects of prescribed medications. Barriers to medication compliance addressed. All patient questions answered. Pt voiced understanding. 5.  Reviewed prior labs and health maintenance  6. Continue current medications, diet and exercise.     Completed Refills   Requested Prescriptions     Signed Prescriptions Disp Refills    methylPREDNISolone (MEDROL DOSEPACK) 4 MG tablet 1 kit 0     Sig: Take by mouth.  baclofen (LIORESAL) 10 MG tablet 15 tablet 0     Sig: Take 1 tablet by mouth 3 times daily for 5 days         Return if symptoms worsen or fail to improve.

## 2022-08-02 ENCOUNTER — OFFICE VISIT (OUTPATIENT)
Dept: PRIMARY CARE CLINIC | Age: 87
End: 2022-08-02
Payer: MEDICARE

## 2022-08-02 VITALS
TEMPERATURE: 97.8 F | HEART RATE: 69 BPM | RESPIRATION RATE: 18 BRPM | BODY MASS INDEX: 26.47 KG/M2 | WEIGHT: 140.2 LBS | SYSTOLIC BLOOD PRESSURE: 158 MMHG | OXYGEN SATURATION: 98 % | DIASTOLIC BLOOD PRESSURE: 68 MMHG | HEIGHT: 61 IN

## 2022-08-02 DIAGNOSIS — Z76.89 ESTABLISHING CARE WITH NEW DOCTOR, ENCOUNTER FOR: Primary | ICD-10-CM

## 2022-08-02 DIAGNOSIS — S39.012A STRAIN OF LUMBAR REGION, INITIAL ENCOUNTER: ICD-10-CM

## 2022-08-02 PROBLEM — C80.1 DUCTAL CARCINOMA (HCC): Status: ACTIVE | Noted: 2019-10-01

## 2022-08-02 PROCEDURE — 99214 OFFICE O/P EST MOD 30 MIN: CPT | Performed by: NURSE PRACTITIONER

## 2022-08-02 PROCEDURE — G8427 DOCREV CUR MEDS BY ELIG CLIN: HCPCS | Performed by: NURSE PRACTITIONER

## 2022-08-02 PROCEDURE — G8417 CALC BMI ABV UP PARAM F/U: HCPCS | Performed by: NURSE PRACTITIONER

## 2022-08-02 PROCEDURE — 1123F ACP DISCUSS/DSCN MKR DOCD: CPT | Performed by: NURSE PRACTITIONER

## 2022-08-02 PROCEDURE — 1090F PRES/ABSN URINE INCON ASSESS: CPT | Performed by: NURSE PRACTITIONER

## 2022-08-02 PROCEDURE — 1036F TOBACCO NON-USER: CPT | Performed by: NURSE PRACTITIONER

## 2022-08-02 ASSESSMENT — PATIENT HEALTH QUESTIONNAIRE - PHQ9
2. FEELING DOWN, DEPRESSED OR HOPELESS: 0
SUM OF ALL RESPONSES TO PHQ QUESTIONS 1-9: 0
SUM OF ALL RESPONSES TO PHQ9 QUESTIONS 1 & 2: 0
SUM OF ALL RESPONSES TO PHQ QUESTIONS 1-9: 0
1. LITTLE INTEREST OR PLEASURE IN DOING THINGS: 0

## 2022-08-02 NOTE — PROGRESS NOTES
mastectomy, [place] Lymph node Bx 11/19. was on tamoxifen for short while    Eczema     History of femur fracture 11/2014    left, hairline    Hyperlipidemia     Hypertension     Menopausal state 1948    ovarian cyst    Osteoporosis 2009    Sinus disorder       Reviewed all health maintenance requirements and ordered appropriate tests  There are no preventive care reminders to display for this patient. Past Surgical History:     Past Surgical History:   Procedure Laterality Date    COLONOSCOPY  11/30/2016    -hemorrhoids    CYST REMOVAL      scalp, sebaceous, head/neck    LAPAROSCOPIC APPENDECTOMY  09/01/2018    with Dr. Jamar Seals. [place] LAP,APPENDECTOMY N/A 9/1/2018    APPENDECTOMY LAPAROSCOPIC performed by Evelin Oakley MD at [address] [place] [address] Dr ROSARIO+INTRAMED SHREYAS Left 5/13/2018    FEMUR IM NAIL SHREYAS INSERTION performed by Luberta Merlin, MD at [address]        Medications:       Prior to Admission medications    Medication Sig Start Date End Date Taking? Authorizing Provider   meloxicam (MOBIC) 7.5 MG tablet Take 1 tablet by mouth in the morning for 7 days.  8/3/22 8/10/22 Yes TAYLOR Villarreal - CANDICE   Methylsulfonylmethane 1000 MG TABS Take by mouth   Yes Historical Provider, MD   [place] 150 MG CAPS Take 1 capsule by mouth   Yes Historical Provider, MD   polyethylene glycol (GLYCOLAX) 17 GM/SCOOP powder Take by mouth   Yes Historical Provider, MD   turmeric 500 MG CAPS Take 1 capsule by mouth   Yes Historical Provider, MD   zinc gluconate 50 MG tablet Take 50 mg by mouth daily   Yes Historical Provider, MD   Ascorbic Acid (VITAMIN C) 250 MG tablet Take 250 mg by mouth daily   Yes Historical Provider, MD   vitamin D (CHOLECALCIFEROL) 1000 UNIT TABS tablet Take 1,000 Units by mouth daily   Yes Historical Provider, MD   MAGNESIUM PO Take by mouth daily   Yes Historical Provider, MD   calcium-vitamin D (OSCAL-500) 500-200 MG-UNIT per tablet Take 1 tablet by mouth 2 times daily 5/16/18  Yes Ramírez Otero TRAE Franco        Allergies:       Barley grass, Food, and Milk-related compounds    Social History:     Tobacco:    reports that she has never smoked. She has never used smokeless tobacco.  Alcohol:      reports no history of alcohol use. Drug Use:  reports no history of drug use. Family History:     Family History   Problem Relation Age of Onset    Stroke Mother     Cancer Father         bladder       Review of Systems:     Positive and Negative as described in HPI    Review of Systems   Constitutional: Negative. HENT: Negative. Eyes: Negative. Respiratory: Negative. Cardiovascular: Negative. Gastrointestinal: Negative. Endocrine: Negative. Genitourinary: Negative. Musculoskeletal:  Positive for back pain. Skin: Negative. Allergic/Immunologic: Negative. Neurological: Negative. Hematological: Negative. Psychiatric/Behavioral: Negative. Physical Exam:   Vitals:  BP (!) 158/68 (Site: Left Upper Arm, Position: Sitting)   Pulse 69   Temp 97.8 °F (36.6 °C) (Temporal)   Resp 18   Ht 5' 1\" (1.549 m)   Wt 140 lb 3.2 oz (63.6 kg)   SpO2 98%   BMI 26.49 kg/m²     Physical Exam  Vitals and nursing note reviewed. Constitutional:       Appearance: Normal appearance. HENT:      Head: Normocephalic. Right Ear: External ear normal.      Left Ear: External ear normal.      Nose: Nose normal.      Mouth/Throat:      Mouth: Mucous membranes are moist.      Pharynx: Oropharynx is clear. Eyes:      Conjunctiva/sclera: Conjunctivae normal.      Pupils: Pupils are equal, round, and reactive to light. Cardiovascular:      Rate and Rhythm: Normal rate and regular rhythm. Heart sounds: Normal heart sounds. Pulmonary:      Effort: Pulmonary effort is normal.      Breath sounds: Normal breath sounds. Musculoskeletal:      Cervical back: Normal range of motion. Lumbar back: Spasms and tenderness present.       Comments: Dorsal Kyphosis   Skin:     General: Skin is warm. Capillary Refill: Capillary refill takes less than 2 seconds. Neurological:      General: No focal deficit present. Mental Status: She is alert and oriented to person, place, and time. Psychiatric:         Mood and Affect: Mood normal.         Behavior: Behavior normal.         Thought Content: Thought content normal.         Judgment: Judgment normal.       Data:     Lab Results   Component Value Date/Time     07/10/2022 02:38 PM    K 4.2 07/10/2022 02:38 PM     07/10/2022 02:38 PM    CO2 23 07/10/2022 02:38 PM    BUN 18 07/10/2022 02:38 PM    CREATININE 0.77 07/10/2022 02:38 PM    GLUCOSE 89 07/10/2022 02:38 PM    PROT 7.8 09/01/2018 11:17 AM    LABALBU 4.0 11/07/2019 12:00 AM    BILITOT 0.7 11/07/2019 12:00 AM    ALKPHOS 73 11/07/2019 12:00 AM    AST 19 10/02/2020 02:52 PM    ALT 14 10/02/2020 02:52 PM     Lab Results   Component Value Date/Time    WBC 9.9 07/10/2022 02:38 PM    RBC 3.84 07/10/2022 02:38 PM    HGB 11.3 07/10/2022 02:38 PM    HCT 34.8 07/10/2022 02:38 PM    MCV 90.6 07/10/2022 02:38 PM    MCH 29.4 07/10/2022 02:38 PM    MCHC 32.5 07/10/2022 02:38 PM    RDW 13.1 07/10/2022 02:38 PM     07/10/2022 02:38 PM    MPV 9.0 07/10/2022 02:38 PM     Lab Results   Component Value Date/Time    TSH 2.01 02/29/2016 03:44 PM     Lab Results   Component Value Date/Time    CHOL 204 10/02/2020 02:52 PM    HDL 60 10/02/2020 02:52 PM       Assessment/Plan:      Diagnosis Orders   1. Establishing care with new doctor, encounter for        2. Strain of lumbar region, initial encounter          Lumbar Strain-Discussed light stretching exercises at home. May continue the Mobic if Tylenol is not controlling the pain. Discussed with Juan Ramon Hargrove trying a half a tab of the Baclofen at this time and to closely monitor if it increases her dizziness. Discussed PT but would like to try to do exercises at home first.  Follow up if no improvement in 2 weeks.     1.  Mandy joshi counseling on the following healthy behaviors: nutrition, exercise, and medication adherence  2. Patient given educational materials - see patient instructions  3. Was a self-tracking handout given in paper form or via [employer]? No  If yes, see orders or list here. 4.  Discussed use, benefit, and side effects of prescribed medications. Barriers to medication compliance addressed. All patient questions answered. Pt voiced understanding. 5.  Reviewed prior labs and health maintenance  6. Continue current medications, diet and exercise. Completed Refills   Requested Prescriptions     Signed Prescriptions Disp Refills    meloxicam (MOBIC) 7.5 MG tablet 7 tablet 0     Sig: Take 1 tablet by mouth in the morning for 7 days. No follow-ups on file.

## 2022-08-03 RX ORDER — MELOXICAM 7.5 MG/1
7.5 TABLET ORAL DAILY
Qty: 7 TABLET | Refills: 0 | Status: SHIPPED | OUTPATIENT
Start: 2022-08-03 | End: 2022-10-06 | Stop reason: ALTCHOICE

## 2022-08-03 ASSESSMENT — ENCOUNTER SYMPTOMS
GASTROINTESTINAL NEGATIVE: 1
BACK PAIN: 1
EYES NEGATIVE: 1
ALLERGIC/IMMUNOLOGIC NEGATIVE: 1
RESPIRATORY NEGATIVE: 1

## 2022-10-06 ENCOUNTER — OFFICE VISIT (OUTPATIENT)
Dept: PRIMARY CARE CLINIC | Age: 87
End: 2022-10-06
Payer: MEDICARE

## 2022-10-06 VITALS
TEMPERATURE: 98.6 F | HEIGHT: 61 IN | SYSTOLIC BLOOD PRESSURE: 130 MMHG | RESPIRATION RATE: 18 BRPM | HEART RATE: 70 BPM | OXYGEN SATURATION: 97 % | BODY MASS INDEX: 26.17 KG/M2 | DIASTOLIC BLOOD PRESSURE: 84 MMHG | WEIGHT: 138.6 LBS

## 2022-10-06 DIAGNOSIS — Z13.220 LIPID SCREENING: ICD-10-CM

## 2022-10-06 DIAGNOSIS — Z00.00 INITIAL MEDICARE ANNUAL WELLNESS VISIT: Primary | ICD-10-CM

## 2022-10-06 DIAGNOSIS — E55.9 VITAMIN D DEFICIENCY, UNSPECIFIED: ICD-10-CM

## 2022-10-06 DIAGNOSIS — M54.41 ACUTE BILATERAL LOW BACK PAIN WITH RIGHT-SIDED SCIATICA: ICD-10-CM

## 2022-10-06 PROCEDURE — 1123F ACP DISCUSS/DSCN MKR DOCD: CPT | Performed by: NURSE PRACTITIONER

## 2022-10-06 PROCEDURE — G8484 FLU IMMUNIZE NO ADMIN: HCPCS | Performed by: NURSE PRACTITIONER

## 2022-10-06 PROCEDURE — G0438 PPPS, INITIAL VISIT: HCPCS | Performed by: NURSE PRACTITIONER

## 2022-10-06 SDOH — ECONOMIC STABILITY: FOOD INSECURITY: WITHIN THE PAST 12 MONTHS, THE FOOD YOU BOUGHT JUST DIDN'T LAST AND YOU DIDN'T HAVE MONEY TO GET MORE.: NEVER TRUE

## 2022-10-06 SDOH — ECONOMIC STABILITY: FOOD INSECURITY: WITHIN THE PAST 12 MONTHS, YOU WORRIED THAT YOUR FOOD WOULD RUN OUT BEFORE YOU GOT MONEY TO BUY MORE.: NEVER TRUE

## 2022-10-06 ASSESSMENT — PATIENT HEALTH QUESTIONNAIRE - PHQ9
SUM OF ALL RESPONSES TO PHQ QUESTIONS 1-9: 0
2. FEELING DOWN, DEPRESSED OR HOPELESS: 0
SUM OF ALL RESPONSES TO PHQ QUESTIONS 1-9: 0
SUM OF ALL RESPONSES TO PHQ9 QUESTIONS 1 & 2: 0
1. LITTLE INTEREST OR PLEASURE IN DOING THINGS: 0

## 2022-10-06 ASSESSMENT — SOCIAL DETERMINANTS OF HEALTH (SDOH): HOW HARD IS IT FOR YOU TO PAY FOR THE VERY BASICS LIKE FOOD, HOUSING, MEDICAL CARE, AND HEATING?: NOT HARD AT ALL

## 2022-10-06 ASSESSMENT — LIFESTYLE VARIABLES
HOW OFTEN DO YOU HAVE A DRINK CONTAINING ALCOHOL: NEVER
HOW MANY STANDARD DRINKS CONTAINING ALCOHOL DO YOU HAVE ON A TYPICAL DAY: PATIENT DOES NOT DRINK

## 2022-10-06 NOTE — PATIENT INSTRUCTIONS
SURVEY:     You may be receiving a survey from [employer] regarding your visit today. Please complete the survey to enable us to provide the highest quality of care to you and your family. If you cannot score us a very good on any question, please call the office to discuss how we could have made your experience a very good one. Thank you,    Oswald Garcia, APRN-CNP  Eugenie Tineo, APRN-CNP  Lolly Martinez, LPN  Loulou Smith, ROBBY Ortiz, ROBBY Joshua, CMA  Gail, PCA  Tracey, PM    Personalized Preventive Plan for Mariaa Reza - 10/6/2022  Medicare offers a range of preventive health benefits. Some of the tests and screenings are paid in full while other may be subject to a deductible, co-insurance, and/or copay. Some of these benefits include a comprehensive review of your medical history including lifestyle, illnesses that may run in your family, and various assessments and screenings as appropriate. After reviewing your medical record and screening and assessments performed today your provider may have ordered immunizations, labs, imaging, and/or referrals for you. A list of these orders (if applicable) as well as your Preventive Care list are included within your After Visit Summary for your review. Other Preventive Recommendations:    A preventive eye exam performed by an eye specialist is recommended every 1-2 years to screen for glaucoma; cataracts, macular degeneration, and other eye disorders. A preventive dental visit is recommended every 6 months. Try to get at least 150 minutes of exercise per week or 10,000 steps per day on a pedometer . Order or download the FREE \"Exercise & Physical Activity: Your Everyday Guide\" from [employer]. Call 8-998.439.3642 or search [employer] online. You need 3107-4685 mg of calcium and 7219-2085 IU of vitamin D per day.  It is possible to meet your calcium requirement with diet alone, but a vitamin D supplement is usually necessary to meet this goal.  When exposed to the sun, use a sunscreen that protects against both UVA and UVB radiation with an SPF of 30 or greater. Reapply every 2 to 3 hours or after sweating, drying off with a towel, or swimming. Always wear a seat belt when traveling in a car. Always wear a helmet when riding a bicycle or motorcycle.

## 2022-10-06 NOTE — PROGRESS NOTES
Medicare Annual Wellness Visit    Becky Pink is here for Medicare AW    Assessment & Plan   Acute bilateral low back pain with right-sided sciatica    Recommendations for Preventive Services Due: see orders and patient instructions/AVS.  Recommended screening schedule for the next 5-10 years is provided to the patient in written form: see Patient Instructions/AVS.     No follow-ups on file. Subjective   The following acute and/or chronic problems were also addressed today:  Lower back pain and right sciatica. She state she will take Tylenol for the pain that will normally help it. She has taken Reclast and takes it once a year and she took it in June. She states she is not going to take this again. Patient's complete Health Risk Assessment and screening values have been reviewed and are found in Flowsheets. The following problems were reviewed today and where indicated follow up appointments were made and/or referrals ordered. Positive Risk Factor Screenings with Interventions:              Health Habits/Nutrition:  Physical Activity: Inactive    Days of Exercise per Week: 0 days    Minutes of Exercise per Session: 0 min     Have you lost any weight without trying in the past 3 months?: No  Body mass index: (!) 26.19  Have you seen the dentist within the past year?: (!) No  Health Habits/Nutrition Interventions:  Dental exam overdue:  patient encouraged to make appointment with his/her dentist    Hearing/Vision:  Do you or your family notice any trouble with your hearing that hasn't been managed with hearing aids?: No  Do you have difficulty driving, watching TV, or doing any of your daily activities because of your eyesight?: No  Have you had an eye exam within the past year?: (!) No  No results found. Hearing/Vision Interventions:  Has not wore glasses in the past.  She did have Cataracts on the right eye and feels like she needs the left eye done.   She states she does plan to have that checked soon. Objective   Vitals:    10/06/22 1408   BP: 130/84   Pulse: 70   Resp: 18   Temp: 98.6 °F (37 °C)   TempSrc: Temporal   SpO2: 97%   Weight: 138 lb 9.6 oz (62.9 kg)   Height: 5' 1\" (1.549 m)      Body mass index is 26.19 kg/m². General Appearance: alert and oriented to person, place and time, well developed and well- nourished, in no acute distress  Skin: warm and dry, no rash or erythema  Head: normocephalic and atraumatic  Eyes: pupils equal, round, and reactive to light, extraocular eye movements intact, conjunctivae normal  ENT: tympanic membrane, external ear and ear canal normal bilaterally, nose without deformity, nasal mucosa and turbinates normal without polyps  Neck: supple and non-tender without mass, no thyromegaly or thyroid nodules, no cervical lymphadenopathy  Pulmonary/Chest: clear to auscultation bilaterally- no wheezes, rales or rhonchi, normal air movement, no respiratory distress  Cardiovascular: normal rate, regular rhythm, normal S1 and S2, no murmurs, rubs, clicks, or gallops, distal pulses intact, no carotid bruits  Abdomen: soft, non-tender, non-distended, normal bowel sounds, no masses or organomegaly  Extremities: no cyanosis, clubbing or edema  Musculoskeletal: normal range of motion, no joint swelling, deformity or tenderness  Neurologic: reflexes normal and symmetric, no cranial nerve deficit, gait, coordination and speech normal       No Known Allergies  Prior to Visit Medications    Medication Sig Taking?  Authorizing Provider   polyethylene glycol (GLYCOLAX) 17 GM/SCOOP powder Take by mouth Yes Historical Provider, MD   turmeric 500 MG CAPS Take 1 capsule by mouth Yes Historical Provider, MD   Ascorbic Acid (VITAMIN C) 250 MG tablet Take 250 mg by mouth daily Yes Historical Provider, MD   MAGNESIUM PO Take by mouth daily Yes Historical Provider, MD   calcium-vitamin D (OSCAL-500) 500-200 MG-UNIT per tablet Take 1 tablet by mouth 2 times daily Yes Joshua Gar TRAE Franco   meloxicam (MOBIC) 7.5 MG tablet Take 1 tablet by mouth in the morning for 7 days.   TAYLOR Vega CNP   Methylsulfonylmethane 1000 MG TABS Take by mouth  Patient not taking: Reported on 10/6/2022  Historical Provider, MD [place] 150 MG CAPS Take 1 capsule by mouth  Patient not taking: Reported on 10/6/2022  Historical Provider, MD   zinc gluconate 50 MG tablet Take 50 mg by mouth daily  Patient not taking: Reported on 10/6/2022  Historical Provider, MD   vitamin D (CHOLECALCIFEROL) 1000 UNIT TABS tablet Take 1,000 Units by mouth daily  Patient not taking: Reported on 10/6/2022  Historical Provider, MD [place] (Including outside providers/suppliers regularly involved in providing care):   Patient Care Team:  TAYLOR Vega CNP as PCP - General (Certified Nurse Practitioner)  TAYLOR Vega CNP as PCP - [facility] Empaneled Provider     Reviewed and updated this visit:  Tobacco  Allergies  Meds  Problems  Med Hx  Surg Hx  Soc Hx  Fam Hx

## 2023-05-08 ENCOUNTER — TELEPHONE (OUTPATIENT)
Dept: PRIMARY CARE CLINIC | Age: 88
End: 2023-05-08

## 2023-05-08 NOTE — TELEPHONE ENCOUNTER
Per attached fax from [address] [address] Patient Dept, an order for Reclast and a bun/cr order is needed.

## 2023-10-09 ENCOUNTER — OFFICE VISIT (OUTPATIENT)
Dept: PRIMARY CARE CLINIC | Age: 88
End: 2023-10-09
Payer: MEDICARE

## 2023-10-09 VITALS
BODY MASS INDEX: 26.32 KG/M2 | TEMPERATURE: 98.7 F | WEIGHT: 139.4 LBS | RESPIRATION RATE: 18 BRPM | HEART RATE: 74 BPM | OXYGEN SATURATION: 98 % | DIASTOLIC BLOOD PRESSURE: 80 MMHG | HEIGHT: 61 IN | SYSTOLIC BLOOD PRESSURE: 132 MMHG

## 2023-10-09 DIAGNOSIS — E56.9 VITAMIN DEFICIENCY: ICD-10-CM

## 2023-10-09 DIAGNOSIS — Z13.220 LIPID SCREENING: ICD-10-CM

## 2023-10-09 DIAGNOSIS — Z00.00 MEDICARE ANNUAL WELLNESS VISIT, SUBSEQUENT: Primary | ICD-10-CM

## 2023-10-09 DIAGNOSIS — Z13.1 DIABETES MELLITUS SCREENING: ICD-10-CM

## 2023-10-09 DIAGNOSIS — E55.9 VITAMIN D DEFICIENCY, UNSPECIFIED: ICD-10-CM

## 2023-10-09 PROCEDURE — G8484 FLU IMMUNIZE NO ADMIN: HCPCS | Performed by: NURSE PRACTITIONER

## 2023-10-09 PROCEDURE — 1123F ACP DISCUSS/DSCN MKR DOCD: CPT | Performed by: NURSE PRACTITIONER

## 2023-10-09 PROCEDURE — G0439 PPPS, SUBSEQ VISIT: HCPCS | Performed by: NURSE PRACTITIONER

## 2023-10-09 SDOH — ECONOMIC STABILITY: INCOME INSECURITY: HOW HARD IS IT FOR YOU TO PAY FOR THE VERY BASICS LIKE FOOD, HOUSING, MEDICAL CARE, AND HEATING?: NOT HARD AT ALL

## 2023-10-09 SDOH — ECONOMIC STABILITY: FOOD INSECURITY: WITHIN THE PAST 12 MONTHS, YOU WORRIED THAT YOUR FOOD WOULD RUN OUT BEFORE YOU GOT MONEY TO BUY MORE.: NEVER TRUE

## 2023-10-09 SDOH — ECONOMIC STABILITY: HOUSING INSECURITY
IN THE LAST 12 MONTHS, WAS THERE A TIME WHEN YOU DID NOT HAVE A STEADY PLACE TO SLEEP OR SLEPT IN A SHELTER (INCLUDING NOW)?: NO

## 2023-10-09 SDOH — ECONOMIC STABILITY: FOOD INSECURITY: WITHIN THE PAST 12 MONTHS, THE FOOD YOU BOUGHT JUST DIDN'T LAST AND YOU DIDN'T HAVE MONEY TO GET MORE.: NEVER TRUE

## 2023-10-09 ASSESSMENT — PATIENT HEALTH QUESTIONNAIRE - PHQ9
1. LITTLE INTEREST OR PLEASURE IN DOING THINGS: 0
SUM OF ALL RESPONSES TO PHQ QUESTIONS 1-9: 0
2. FEELING DOWN, DEPRESSED OR HOPELESS: 0
SUM OF ALL RESPONSES TO PHQ QUESTIONS 1-9: 0
SUM OF ALL RESPONSES TO PHQ9 QUESTIONS 1 & 2: 0
SUM OF ALL RESPONSES TO PHQ QUESTIONS 1-9: 0
SUM OF ALL RESPONSES TO PHQ QUESTIONS 1-9: 0

## 2023-10-09 NOTE — PATIENT INSTRUCTIONS
SURVEY:     You may be receiving a survey from [employer] regarding your visit today. Please complete the survey to enable us to provide the highest quality of care to you and your family. If you cannot score us a very good on any question, please call the office to discuss how we could have made your experience a very good one. Thank you,    Dea Garcia, APRN-CNP  Annalee Acharya, APRN-CNP  Thais Dover, LPN  Rosey Schlatter, ROBBY Adams, ROBBY Joshua, CMA  Gail, PCA  Tracey, PM         Learning About Dental Care for Older Adults  Dental care for older adults: Overview  Dental care for older people is much the same as for younger adults. But older adults do have concerns that younger adults do not. Older adults may have problems with gum disease and decay on the roots of their teeth. They may need missing teeth replaced or broken fillings fixed. Or they may have dentures that need to be cared for. Some older adults may have trouble holding a toothbrush. You can help remind the person you are caring for to brush and floss their teeth or to clean their dentures. In some cases, you may need to do the brushing and other dental care tasks. People who have trouble using their hands or who have dementia may need this extra help. How can you help with dental care? Normal dental care  To keep the teeth and gums healthy:  Brush the teeth with fluoride toothpaste twice a day--in the morning and at night--and floss at least once a day. Plaque can quickly build up on the teeth of older adults. Watch for the signs of gum disease. These signs include gums that bleed after brushing or after eating hard foods, such as apples. See a dentist regularly. Many experts recommend checkups every 6 months. Keep the dentist up to date on any new medications the person is taking. Encourage a balanced diet that includes whole grains, vegetables, and fruits, and that is low in saturated fat and sodium.   Encourage the person you're caring for

## 2023-10-09 NOTE — PROGRESS NOTES
and well- nourished, in no acute distress  Skin: warm and dry, no rash or erythema  Head: normocephalic and atraumatic  Eyes: pupils equal, round, and reactive to light, extraocular eye movements intact, conjunctivae normal  ENT: tympanic membrane, external ear and ear canal normal bilaterally, nose without deformity, nasal mucosa and turbinates normal without polyps  Neck: supple and non-tender without mass, no thyromegaly or thyroid nodules, no cervical lymphadenopathy  Pulmonary/Chest: clear to auscultation bilaterally- no wheezes, rales or rhonchi, normal air movement, no respiratory distress  Cardiovascular: normal rate, regular rhythm, normal S1 and S2, no murmurs, rubs, clicks, or gallops, distal pulses intact, no carotid bruits  Abdomen: soft, non-tender, non-distended, normal bowel sounds, no masses or organomegaly  Extremities: no cyanosis, clubbing or edema  Musculoskeletal: normal range of motion, no joint swelling, deformity or tenderness  Neurologic: reflexes normal and symmetric, no cranial nerve deficit, gait, coordination and speech normal       No Known Allergies  Prior to Visit Medications    Medication Sig Taking?  Authorizing Provider   turmeric 500 MG CAPS Take 1 capsule by mouth Yes Royce Macias MD   zinc gluconate 50 MG tablet Take 1 tablet by mouth daily Yes Royce Macias MD   Ascorbic Acid (VITAMIN C) 250 MG tablet Take 1 tablet by mouth daily Yes Royce Macias MD   vitamin D (CHOLECALCIFEROL) 1000 UNIT TABS tablet Take 1 tablet by mouth daily Yes Royce Macias MD   MAGNESIUM PO Take by mouth daily Yes Royce Macias MD       CareTeam (Including outside providers/suppliers regularly involved in providing care):   Patient Care Team:  TAYLOR Linda CNP as PCP - General (Certified Nurse Practitioner)  TAYLOR Linda CNP as PCP - Empaneled Provider     Reviewed and updated this visit:  Tobacco  Allergies  Meds  Problems  Med Hx

## 2024-08-29 ENCOUNTER — TELEPHONE (OUTPATIENT)
Dept: PRIMARY CARE CLINIC | Age: 89
End: 2024-08-29

## 2024-08-29 DIAGNOSIS — M54.41 ACUTE BILATERAL LOW BACK PAIN WITH RIGHT-SIDED SCIATICA: ICD-10-CM

## 2024-08-29 RX ORDER — METHYLPREDNISOLONE 4 MG
TABLET, DOSE PACK ORAL
Qty: 1 KIT | Refills: 0 | Status: SHIPPED | OUTPATIENT
Start: 2024-08-29

## 2024-08-29 RX ORDER — BACLOFEN 10 MG/1
10 TABLET ORAL 3 TIMES DAILY
Qty: 15 TABLET | Refills: 0 | Status: SHIPPED | OUTPATIENT
Start: 2024-08-29 | End: 2024-09-03

## 2024-08-29 NOTE — TELEPHONE ENCOUNTER
Patient contacted office requesting pain medication for her sciatic nerve. Patient use to have issues with this prior but had it under control, patient states this recent flare up has been on going for a little over a week. Patient states tylenol and ibuprofen is no longer helping and she has been babying it.       Please advise.

## 2024-09-06 RX ORDER — MELOXICAM 7.5 MG/1
7.5 TABLET ORAL DAILY
Qty: 30 TABLET | Refills: 0 | Status: SHIPPED | OUTPATIENT
Start: 2024-09-06

## 2024-09-06 NOTE — TELEPHONE ENCOUNTER
Patient called office stating that the medrol dose pack and muscle relaxer did not help her leg pain.  Patient states 2 years ago she had the same pain and she was prescribed mobic and that helped her.  Order pended to be signed.

## 2024-09-09 ENCOUNTER — TELEPHONE (OUTPATIENT)
Dept: PRIMARY CARE CLINIC | Age: 89
End: 2024-09-09

## 2024-09-09 RX ORDER — ETODOLAC 400 MG
400 TABLET ORAL 2 TIMES DAILY
Qty: 60 TABLET | Refills: 0 | Status: SHIPPED | OUTPATIENT
Start: 2024-09-09

## 2024-10-07 ENCOUNTER — TELEMEDICINE (OUTPATIENT)
Dept: PRIMARY CARE CLINIC | Age: 89
End: 2024-10-07

## 2024-10-07 DIAGNOSIS — M54.41 ACUTE BILATERAL LOW BACK PAIN WITH RIGHT-SIDED SCIATICA: ICD-10-CM

## 2024-10-07 RX ORDER — ETODOLAC 400 MG
400 TABLET ORAL 2 TIMES DAILY
Qty: 60 TABLET | Refills: 0 | Status: SHIPPED | OUTPATIENT
Start: 2024-10-07

## 2024-10-07 ASSESSMENT — PATIENT HEALTH QUESTIONNAIRE - PHQ9
2. FEELING DOWN, DEPRESSED OR HOPELESS: NOT AT ALL
SUM OF ALL RESPONSES TO PHQ QUESTIONS 1-9: 0
SUM OF ALL RESPONSES TO PHQ9 QUESTIONS 1 & 2: 0
SUM OF ALL RESPONSES TO PHQ QUESTIONS 1-9: 0
1. LITTLE INTEREST OR PLEASURE IN DOING THINGS: NOT AT ALL

## 2024-10-07 ASSESSMENT — ENCOUNTER SYMPTOMS
BACK PAIN: 1
ALLERGIC/IMMUNOLOGIC NEGATIVE: 1
EYES NEGATIVE: 1
GASTROINTESTINAL NEGATIVE: 1
RESPIRATORY NEGATIVE: 1

## 2024-10-07 NOTE — PATIENT INSTRUCTIONS
SURVEY:     You may be receiving a survey from [facility] regarding your visit today.     Please complete the survey to enable us to provide the highest quality of care to you and your family.     If you cannot score us a very good on any question, please call the office to discuss how we could have made your experience a very good one.     Thank you,    Jonathon Garcia, APRN-CNP  Kathryn Shaw, APRN-CNP  Gabriela, LPN  Esperanza, CMA  Manoj, CMA  Josiane, CMA  Gail, PCA  Joy, CMA  Tracey, PM

## 2024-10-07 NOTE — PROGRESS NOTES
Take 1 tablet by mouth 2 times daily      No follow-ups on file.       Subjective   Back Pain      Review of Systems   Constitutional: Negative.    HENT: Negative.     Eyes: Negative.    Respiratory: Negative.     Cardiovascular: Negative.    Gastrointestinal: Negative.    Endocrine: Negative.    Genitourinary: Negative.    Musculoskeletal:  Positive for back pain.   Allergic/Immunologic: Negative.    Neurological: Negative.    Hematological: Negative.    Psychiatric/Behavioral: Negative.            Objective   Patient-Reported Vitals  No data recorded     Physical Exam  [INSTRUCTIONS:  \"[x]\" Indicates a positive item  \"[]\" Indicates a negative item  -- DELETE ALL ITEMS NOT EXAMINED]    Constitutional: [x] Appears well-developed and well-nourished [x] No apparent distress      [] Abnormal -     Mental status: [x] Alert and awake  [x] Oriented to person/place/time [x] Able to follow commands    [] Abnormal -     Eyes:   EOM    [x]  Normal    [] Abnormal -   Sclera  [x]  Normal    [] Abnormal -          Discharge [x]  None visible   [] Abnormal -     HENT: [x] Normocephalic, atraumatic  [] Abnormal -   [x] Mouth/Throat: Mucous membranes are moist    External Ears [x] Normal  [] Abnormal -    Neck: [x] No visualized mass [] Abnormal -     Pulmonary/Chest: [x] Respiratory effort normal   [x] No visualized signs of difficulty breathing or respiratory distress        [] Abnormal -      Musculoskeletal:   [x] Normal gait with no signs of ataxia         [x] Normal range of motion of neck        [] Abnormal -     Neurological:        [x] No Facial Asymmetry (Cranial nerve 7 motor function) (limited exam due to video visit)          [x] No gaze palsy        [] Abnormal -          Skin:        [x] No significant exanthematous lesions or discoloration noted on facial skin         [] Abnormal -            Psychiatric:       [x] Normal Affect [] Abnormal -        [x] No Hallucinations    Other pertinent observable physical exam

## 2024-10-16 ENCOUNTER — TELEPHONE (OUTPATIENT)
Dept: PRIMARY CARE CLINIC | Age: 89
End: 2024-10-16

## 2024-10-16 NOTE — TELEPHONE ENCOUNTER
Patient contacted office stating that she is taking  tylenol every 6 hours, patient believes she is getting constipated from doing so. Patient states she might have impaction, and has tried to use prune juice, and miralax and is not having any results. Patient asking for recommendations.         Please advise.

## 2024-10-17 DIAGNOSIS — K59.03 DRUG-INDUCED CONSTIPATION: Primary | ICD-10-CM

## 2024-10-17 RX ORDER — DOCUSATE SODIUM 100 MG/1
100 CAPSULE, LIQUID FILLED ORAL 3 TIMES DAILY PRN
Qty: 60 CAPSULE | Refills: 0 | Status: SHIPPED | OUTPATIENT
Start: 2024-10-17 | End: 2024-11-16

## 2025-06-09 ENCOUNTER — TELEPHONE (OUTPATIENT)
Dept: PRIMARY CARE CLINIC | Age: 89
End: 2025-06-09

## 2025-07-07 ENCOUNTER — OFFICE VISIT (OUTPATIENT)
Dept: PRIMARY CARE CLINIC | Age: 89
End: 2025-07-07
Payer: MEDICARE

## 2025-07-07 VITALS
SYSTOLIC BLOOD PRESSURE: 128 MMHG | DIASTOLIC BLOOD PRESSURE: 86 MMHG | HEART RATE: 61 BPM | WEIGHT: 130.4 LBS | HEIGHT: 61 IN | RESPIRATION RATE: 18 BRPM | BODY MASS INDEX: 24.62 KG/M2 | OXYGEN SATURATION: 98 %

## 2025-07-07 DIAGNOSIS — E55.9 VITAMIN D DEFICIENCY, UNSPECIFIED: ICD-10-CM

## 2025-07-07 DIAGNOSIS — Z00.00 MEDICARE ANNUAL WELLNESS VISIT, SUBSEQUENT: Primary | ICD-10-CM

## 2025-07-07 DIAGNOSIS — Z13.220 LIPID SCREENING: ICD-10-CM

## 2025-07-07 PROBLEM — I50.32 CHRONIC DIASTOLIC CHF (CONGESTIVE HEART FAILURE) (HCC): Chronic | Status: RESOLVED | Noted: 2018-05-14 | Resolved: 2025-07-07

## 2025-07-07 PROBLEM — C80.1 DUCTAL CARCINOMA (HCC): Status: RESOLVED | Noted: 2019-10-01 | Resolved: 2025-07-07

## 2025-07-07 PROBLEM — C50.919: Status: RESOLVED | Noted: 2022-05-20 | Resolved: 2025-07-07

## 2025-07-07 PROCEDURE — G0439 PPPS, SUBSEQ VISIT: HCPCS | Performed by: NURSE PRACTITIONER

## 2025-07-07 PROCEDURE — 1123F ACP DISCUSS/DSCN MKR DOCD: CPT | Performed by: NURSE PRACTITIONER

## 2025-07-07 PROCEDURE — 1160F RVW MEDS BY RX/DR IN RCRD: CPT | Performed by: NURSE PRACTITIONER

## 2025-07-07 PROCEDURE — 1159F MED LIST DOCD IN RCRD: CPT | Performed by: NURSE PRACTITIONER

## 2025-07-07 RX ORDER — IBUPROFEN 400 MG/1
400 TABLET, FILM COATED ORAL EVERY 6 HOURS PRN
COMMUNITY

## 2025-07-07 RX ORDER — IBUPROFEN 400 MG/1
400 TABLET, FILM COATED ORAL EVERY 6 HOURS PRN
COMMUNITY
End: 2025-07-07

## 2025-07-07 SDOH — ECONOMIC STABILITY: FOOD INSECURITY: WITHIN THE PAST 12 MONTHS, YOU WORRIED THAT YOUR FOOD WOULD RUN OUT BEFORE YOU GOT MONEY TO BUY MORE.: PATIENT DECLINED

## 2025-07-07 SDOH — ECONOMIC STABILITY: FOOD INSECURITY: WITHIN THE PAST 12 MONTHS, THE FOOD YOU BOUGHT JUST DIDN'T LAST AND YOU DIDN'T HAVE MONEY TO GET MORE.: PATIENT DECLINED

## 2025-07-07 ASSESSMENT — PATIENT HEALTH QUESTIONNAIRE - PHQ9
SUM OF ALL RESPONSES TO PHQ QUESTIONS 1-9: 0
2. FEELING DOWN, DEPRESSED OR HOPELESS: NOT AT ALL
1. LITTLE INTEREST OR PLEASURE IN DOING THINGS: NOT AT ALL
SUM OF ALL RESPONSES TO PHQ QUESTIONS 1-9: 0

## 2025-07-07 NOTE — PROGRESS NOTES
Medicare Annual Wellness Visit    Mandy Hampton is here for Medicare AWV (No complaints )    Assessment & Plan   Medicare annual wellness visit, subsequent  -     Comprehensive Metabolic Panel, Fasting; Future  -     CBC with Auto Differential; Future  Vitamin D deficiency, unspecified  -     Vitamin D 25 Hydroxy; Future  Lipid screening  -     Lipid Panel; Future       Return in 1 year (on 7/7/2026) for Medicare AWV.     Subjective   The following acute and/or chronic problems were also addressed today: History of breast cancer and had mastectomy in 2019.  Has had all of her follow up in [place].  No new medical concerns voiced today.      Patient's complete Health Risk Assessment and screening values have been reviewed and are found in Flowsheets. The following problems were reviewed today and where indicated follow up appointments were made and/or referrals ordered.    Positive Risk Factor Screenings with Interventions:    Fall Risk:  Do you feel unsteady or are you worried about falling? : (!) yes (uses cane)  2 or more falls in past year?: no  Fall with injury in past year?: no     Interventions:    Patient comments: Uses a cane during the day and uses a walker at home at night.  Reviewed medications, home hazards, visual acuity, and co-morbidities that can increase risk for falls                Dentist Screen:  Have you seen the dentist within the past year?: (!) No    Intervention:  Not applicable - dentures     Vision Screen:  Do you have difficulty driving, watching TV, or doing any of your daily activities because of your eyesight?: No  Have you had an eye exam within the past year?: (!) No  Interventions:   Patient declines any further evaluation or treatment      Advanced Directives:  Do you have a Living Will?: (!) No    Intervention:  has NO advanced directive - not interested in additional information            Objective   Vitals:    07/07/25 1131   BP: 128/86   BP Site: Left Upper Arm

## (undated) DEVICE — ROD RMR L950MM DIA2.5MM W/ EXTN BALL TIP

## (undated) DEVICE — 1840 FOAM BLOCK NEEDLE COUNTER: Brand: DEVON

## (undated) DEVICE — GUIDEWIRE ORTH L400MM DIA3.2MM FOR TFN

## (undated) DEVICE — INTENDED FOR TISSUE SEPARATION, AND OTHER PROCEDURES THAT REQUIRE A SHARP SURGICAL BLADE TO PUNCTURE OR CUT.: Brand: BARD-PARKER ® CARBON RIB-BACK BLADES

## (undated) DEVICE — 1000 S-DRAPE TOWEL DRAPE 10/BX: Brand: STERI-DRAPE™

## (undated) DEVICE — PEN: MARKING STD 100/CS: Brand: MEDICAL ACTION INDUSTRIES

## (undated) DEVICE — Z INACTIVE USE 2660664 SOLUTION IRRIG 3000ML 0.9% SOD CHL USP UROMATIC PLAS CONT

## (undated) DEVICE — Device

## (undated) DEVICE — PAD,ABDOMINAL,8"X10",ST,LF: Brand: MEDLINE

## (undated) DEVICE — SUTURE SZ 0 27IN 5/8 CIR UR-6  TAPER PT VIOLET ABSRB VICRYL J603H

## (undated) DEVICE — HYPODERMIC SAFETY NEEDLE: Brand: MAGELLAN

## (undated) DEVICE — SUTURE VCRL + SZ 2-0 L27IN ABSRB WHT SH 1/2 CIR TAPERCUT VCP417H

## (undated) DEVICE — GLOVE SURG SZ 75 L12IN FNGR THK87MIL WHT LTX FREE

## (undated) DEVICE — SOLUTION IV IRRIG POUR BRL 0.9% SODIUM CHL 2F7124

## (undated) DEVICE — TROCAR: Brand: KII FIOS FIRST ENTRY

## (undated) DEVICE — SYRINGE BULB 50/CS 48/PLT: Brand: MEDEGEN MEDICAL PRODUCTS, LLC

## (undated) DEVICE — PENCIL ES L3M BTTN SWCH HOLSTER W/ BLDE ELECTRD EDGE

## (undated) DEVICE — BAG SPEC LAP H6IN DIA3IN 250ML 10 12MM CANN ATTCH MEM WIRE

## (undated) DEVICE — STAPLER INT L340MM 45MM STD 12 FIRING B FRM PWR + GRIPPING

## (undated) DEVICE — BLADE SURG NO10 C STL STR DISP GLASSVAN

## (undated) DEVICE — PADDING UNDERCAST W4INXL4YD COT FBR LO LINTING WYTEX

## (undated) DEVICE — 3M™ TEGADERM™ +PAD FILM DRESSING WITH NON-ADHERENT PAD, 3587, 3-1/2 IN X 4-1/8 IN (9 CM X 10.5 CM), 25/CAR, 4 CAR/CS: Brand: 3M™ TEGADERM™

## (undated) DEVICE — 6617 IOBAN II PATIENT ISOLATION DRAPE 5/BX,4BX/CS: Brand: STERI-DRAPE™ IOBAN™ 2

## (undated) DEVICE — SYRINGE, LUER LOCK, 10ML: Brand: MEDLINE

## (undated) DEVICE — Z DISCONTINUED PER MEDLINE USE 2741942 DRESSING AQUACEL 6 IN ALG W9XL15CM SIL CVR WTRPRF VIR BACT BARR ANTIMIC

## (undated) DEVICE — INSUFFLATION NEEDLE TO ESTABLISH PNEUMOPERITONEUM.: Brand: INSUFFLATION NEEDLE

## (undated) DEVICE — 35 ML SYRINGE LUER-LOCK TIP: Brand: MONOJECT

## (undated) DEVICE — SUTURE VCRL SZ 0 L36IN ABSRB UD L36MM CT-1 1/2 CIR J946H

## (undated) DEVICE — NEEDLE HYPO 22GA L1.5IN BLK S STL HUB POLYPR SHLD REG BVL

## (undated) DEVICE — SPONGE LAP W18XL18IN WHT COT 4 PLY FLD STRUNG RADPQ DISP ST

## (undated) DEVICE — STAPLER SKIN L320MM 35MM VASC TISS 12 FIRING B FRM PWR

## (undated) DEVICE — TROCAR: Brand: KII® OPTICAL ACCESS SYSTEM

## (undated) DEVICE — STAPLER EXT SKIN 35 WIDE S STL STPL SQUEEZE HNDL VISISTAT

## (undated) DEVICE — DISSECTOR LAP DIA5MM BLNT TIP ENDOPATH

## (undated) DEVICE — 3M™ STERI-DRAPE™ U-DRAPE 1015: Brand: STERI-DRAPE™

## (undated) DEVICE — CHLORAPREP 26ML ORANGE

## (undated) DEVICE — DISCONTINUED NO SUB IDED TG GLOVE SURG SENSICARE ALOE LT LF PF ST GRN SZ 8

## (undated) DEVICE — GLOVE ORANGE PI 7 1/2   MSG9075

## (undated) DEVICE — DBD-PACK,BASIC,VI,AURORA: Brand: MEDLINE

## (undated) DEVICE — ELECTRODE ES AD CRD L15FT DISP FOR PT BELOW 30LB REM

## (undated) DEVICE — YANKAUER,FLEXIBLE HANDLE,REGLR CAPACITY: Brand: MEDLINE INDUSTRIES, INC.

## (undated) DEVICE — COVER LT HNDL BLU PLAS

## (undated) DEVICE — SPONGE GAUZE ST VERSALON 2X2IN ABS

## (undated) DEVICE — SOLUTION ANTIFOG VIS SYS CLEARIFY LAPSCP

## (undated) DEVICE — TOTAL TRAY, DB, 100% SILI FOLEY, 16FR 10: Brand: MEDLINE

## (undated) DEVICE — TUBING, SUCTION, 9/32" X 12', STRAIGHT: Brand: MEDLINE INDUSTRIES, INC.

## (undated) DEVICE — 3M™ TEGADERM™ +PAD FILM DRESSING WITH NON-ADHERENT PAD, 3586, 3-1/2 IN X 4 IN (9 CM X 10 CM), 25/CAR, 4 CAR/CS: Brand: 3M™ TEGADERM™